# Patient Record
Sex: MALE | Race: WHITE | Employment: OTHER
[De-identification: names, ages, dates, MRNs, and addresses within clinical notes are randomized per-mention and may not be internally consistent; named-entity substitution may affect disease eponyms.]

---

## 2024-01-10 ENCOUNTER — OFFICE VISIT (OUTPATIENT)
Dept: INTERNAL MEDICINE CLINIC | Facility: CLINIC | Age: 69
End: 2024-01-10
Payer: MEDICARE

## 2024-01-10 VITALS
HEIGHT: 72 IN | WEIGHT: 284 LBS | TEMPERATURE: 98.3 F | HEART RATE: 68 BPM | BODY MASS INDEX: 38.47 KG/M2 | SYSTOLIC BLOOD PRESSURE: 162 MMHG | DIASTOLIC BLOOD PRESSURE: 94 MMHG | OXYGEN SATURATION: 97 %

## 2024-01-10 DIAGNOSIS — C43.62 MELANOMA OF LEFT UPPER ARM (HCC): ICD-10-CM

## 2024-01-10 DIAGNOSIS — Z13.220 SCREENING FOR HYPERCHOLESTEROLEMIA: ICD-10-CM

## 2024-01-10 DIAGNOSIS — I10 ESSENTIAL HYPERTENSION: ICD-10-CM

## 2024-01-10 DIAGNOSIS — E11.9 TYPE 2 DIABETES MELLITUS WITHOUT COMPLICATION, WITHOUT LONG-TERM CURRENT USE OF INSULIN (HCC): ICD-10-CM

## 2024-01-10 DIAGNOSIS — E11.9 TYPE 2 DIABETES MELLITUS WITHOUT COMPLICATION, WITHOUT LONG-TERM CURRENT USE OF INSULIN (HCC): Primary | ICD-10-CM

## 2024-01-10 LAB
ALBUMIN SERPL-MCNC: 3.9 G/DL (ref 3.2–4.6)
ALBUMIN/GLOB SERPL: 1.1 (ref 0.4–1.6)
ALP SERPL-CCNC: 69 U/L (ref 50–136)
ALT SERPL-CCNC: 34 U/L (ref 12–65)
ANION GAP SERPL CALC-SCNC: 5 MMOL/L (ref 2–11)
AST SERPL-CCNC: 23 U/L (ref 15–37)
BASOPHILS # BLD: 0 K/UL (ref 0–0.2)
BASOPHILS NFR BLD: 1 % (ref 0–2)
BILIRUB DIRECT SERPL-MCNC: 0.2 MG/DL
BILIRUB SERPL-MCNC: 0.4 MG/DL (ref 0.2–1.1)
BUN SERPL-MCNC: 24 MG/DL (ref 8–23)
CALCIUM SERPL-MCNC: 9.5 MG/DL (ref 8.3–10.4)
CHLORIDE SERPL-SCNC: 105 MMOL/L (ref 103–113)
CHOLEST SERPL-MCNC: 172 MG/DL
CO2 SERPL-SCNC: 26 MMOL/L (ref 21–32)
CREAT SERPL-MCNC: 0.9 MG/DL (ref 0.8–1.5)
DIFFERENTIAL METHOD BLD: ABNORMAL
EOSINOPHIL # BLD: 0.5 K/UL (ref 0–0.8)
EOSINOPHIL NFR BLD: 7 % (ref 0.5–7.8)
ERYTHROCYTE [DISTWIDTH] IN BLOOD BY AUTOMATED COUNT: 13.1 % (ref 11.9–14.6)
GLOBULIN SER CALC-MCNC: 3.4 G/DL (ref 2.8–4.5)
GLUCOSE SERPL-MCNC: 129 MG/DL (ref 65–100)
HCT VFR BLD AUTO: 38.4 % (ref 41.1–50.3)
HGB BLD-MCNC: 12.9 G/DL (ref 13.6–17.2)
IMM GRANULOCYTES # BLD AUTO: 0 K/UL (ref 0–0.5)
IMM GRANULOCYTES NFR BLD AUTO: 0 % (ref 0–5)
LYMPHOCYTES # BLD: 1.5 K/UL (ref 0.5–4.6)
LYMPHOCYTES NFR BLD: 20 % (ref 13–44)
MCH RBC QN AUTO: 33.3 PG (ref 26.1–32.9)
MCHC RBC AUTO-ENTMCNC: 33.6 G/DL (ref 31.4–35)
MCV RBC AUTO: 99.2 FL (ref 82–102)
MONOCYTES # BLD: 0.9 K/UL (ref 0.1–1.3)
MONOCYTES NFR BLD: 12 % (ref 4–12)
NEUTS SEG # BLD: 4.3 K/UL (ref 1.7–8.2)
NEUTS SEG NFR BLD: 60 % (ref 43–78)
NRBC # BLD: 0 K/UL (ref 0–0.2)
PLATELET # BLD AUTO: 231 K/UL (ref 150–450)
PMV BLD AUTO: 10.6 FL (ref 9.4–12.3)
POTASSIUM SERPL-SCNC: 4.9 MMOL/L (ref 3.5–5.1)
PROT SERPL-MCNC: 7.3 G/DL (ref 6.3–8.2)
RBC # BLD AUTO: 3.87 M/UL (ref 4.23–5.6)
SODIUM SERPL-SCNC: 136 MMOL/L (ref 136–146)
WBC # BLD AUTO: 7.2 K/UL (ref 4.3–11.1)

## 2024-01-10 PROCEDURE — 2022F DILAT RTA XM EVC RTNOPTHY: CPT | Performed by: INTERNAL MEDICINE

## 2024-01-10 PROCEDURE — G8417 CALC BMI ABV UP PARAM F/U: HCPCS | Performed by: INTERNAL MEDICINE

## 2024-01-10 PROCEDURE — 1036F TOBACCO NON-USER: CPT | Performed by: INTERNAL MEDICINE

## 2024-01-10 PROCEDURE — 99204 OFFICE O/P NEW MOD 45 MIN: CPT | Performed by: INTERNAL MEDICINE

## 2024-01-10 PROCEDURE — 3077F SYST BP >= 140 MM HG: CPT | Performed by: INTERNAL MEDICINE

## 2024-01-10 PROCEDURE — 3046F HEMOGLOBIN A1C LEVEL >9.0%: CPT | Performed by: INTERNAL MEDICINE

## 2024-01-10 PROCEDURE — 3017F COLORECTAL CA SCREEN DOC REV: CPT | Performed by: INTERNAL MEDICINE

## 2024-01-10 PROCEDURE — G8427 DOCREV CUR MEDS BY ELIG CLIN: HCPCS | Performed by: INTERNAL MEDICINE

## 2024-01-10 PROCEDURE — 1123F ACP DISCUSS/DSCN MKR DOCD: CPT | Performed by: INTERNAL MEDICINE

## 2024-01-10 PROCEDURE — 3080F DIAST BP >= 90 MM HG: CPT | Performed by: INTERNAL MEDICINE

## 2024-01-10 PROCEDURE — G8484 FLU IMMUNIZE NO ADMIN: HCPCS | Performed by: INTERNAL MEDICINE

## 2024-01-10 RX ORDER — GABAPENTIN 300 MG/1
300 CAPSULE ORAL 3 TIMES DAILY
COMMUNITY
Start: 2021-12-06 | End: 2024-01-10

## 2024-01-10 RX ORDER — METFORMIN HYDROCHLORIDE 750 MG/1
TABLET, EXTENDED RELEASE ORAL
COMMUNITY
Start: 2023-12-24

## 2024-01-10 RX ORDER — CARVEDILOL 6.25 MG/1
6.25 TABLET ORAL 2 TIMES DAILY WITH MEALS
COMMUNITY

## 2024-01-10 RX ORDER — HYDRALAZINE HYDROCHLORIDE 50 MG/1
TABLET, FILM COATED ORAL
COMMUNITY
Start: 2023-11-24

## 2024-01-10 RX ORDER — BENAZEPRIL HYDROCHLORIDE 40 MG/1
40 TABLET ORAL EVERY MORNING
COMMUNITY
Start: 2011-05-11

## 2024-01-10 RX ORDER — DESONIDE 0.5 MG/G
1 CREAM TOPICAL
COMMUNITY

## 2024-01-10 RX ORDER — TRAMETINIB 2 MG/1
2 TABLET, FILM COATED ORAL DAILY
COMMUNITY
Start: 2021-10-27 | End: 2024-01-10

## 2024-01-10 RX ORDER — MELOXICAM 15 MG/1
15 TABLET ORAL DAILY
COMMUNITY
End: 2024-01-10 | Stop reason: SINTOL

## 2024-01-10 RX ORDER — KETOCONAZOLE 20 MG/G
1 CREAM TOPICAL
COMMUNITY

## 2024-01-10 RX ORDER — DABRAFENIB 75 MG/1
CAPSULE ORAL
COMMUNITY
Start: 2021-10-27 | End: 2024-01-10

## 2024-01-10 RX ORDER — AMLODIPINE BESYLATE 10 MG/1
10 TABLET ORAL DAILY
COMMUNITY
Start: 2017-12-15 | End: 2024-01-10

## 2024-01-10 RX ORDER — NEBIVOLOL 5 MG/1
5 TABLET ORAL EVERY MORNING
COMMUNITY
Start: 2011-05-11 | End: 2024-01-10

## 2024-01-10 RX ORDER — LISINOPRIL 40 MG/1
40 TABLET ORAL
COMMUNITY

## 2024-01-10 RX ORDER — SPIRONOLACTONE 25 MG/1
25 TABLET ORAL
COMMUNITY

## 2024-01-10 RX ORDER — ASPIRIN 81 MG/1
81 TABLET, CHEWABLE ORAL DAILY
COMMUNITY

## 2024-01-10 SDOH — ECONOMIC STABILITY: FOOD INSECURITY: WITHIN THE PAST 12 MONTHS, YOU WORRIED THAT YOUR FOOD WOULD RUN OUT BEFORE YOU GOT MONEY TO BUY MORE.: NEVER TRUE

## 2024-01-10 SDOH — ECONOMIC STABILITY: FOOD INSECURITY: WITHIN THE PAST 12 MONTHS, THE FOOD YOU BOUGHT JUST DIDN'T LAST AND YOU DIDN'T HAVE MONEY TO GET MORE.: NEVER TRUE

## 2024-01-10 SDOH — ECONOMIC STABILITY: INCOME INSECURITY: HOW HARD IS IT FOR YOU TO PAY FOR THE VERY BASICS LIKE FOOD, HOUSING, MEDICAL CARE, AND HEATING?: NOT HARD AT ALL

## 2024-01-10 SDOH — ECONOMIC STABILITY: HOUSING INSECURITY
IN THE LAST 12 MONTHS, WAS THERE A TIME WHEN YOU DID NOT HAVE A STEADY PLACE TO SLEEP OR SLEPT IN A SHELTER (INCLUDING NOW)?: NO

## 2024-01-10 ASSESSMENT — ANXIETY QUESTIONNAIRES
5. BEING SO RESTLESS THAT IT IS HARD TO SIT STILL: 0
1. FEELING NERVOUS, ANXIOUS, OR ON EDGE: 0
6. BECOMING EASILY ANNOYED OR IRRITABLE: 0
2. NOT BEING ABLE TO STOP OR CONTROL WORRYING: 0
IF YOU CHECKED OFF ANY PROBLEMS ON THIS QUESTIONNAIRE, HOW DIFFICULT HAVE THESE PROBLEMS MADE IT FOR YOU TO DO YOUR WORK, TAKE CARE OF THINGS AT HOME, OR GET ALONG WITH OTHER PEOPLE: NOT DIFFICULT AT ALL
GAD7 TOTAL SCORE: 0
7. FEELING AFRAID AS IF SOMETHING AWFUL MIGHT HAPPEN: 0
4. TROUBLE RELAXING: 0
3. WORRYING TOO MUCH ABOUT DIFFERENT THINGS: 0

## 2024-01-10 ASSESSMENT — PATIENT HEALTH QUESTIONNAIRE - PHQ9
2. FEELING DOWN, DEPRESSED OR HOPELESS: 0
SUM OF ALL RESPONSES TO PHQ QUESTIONS 1-9: 0
1. LITTLE INTEREST OR PLEASURE IN DOING THINGS: 0
SUM OF ALL RESPONSES TO PHQ9 QUESTIONS 1 & 2: 0
SUM OF ALL RESPONSES TO PHQ QUESTIONS 1-9: 0

## 2024-01-10 ASSESSMENT — ENCOUNTER SYMPTOMS: SHORTNESS OF BREATH: 0

## 2024-01-10 NOTE — PROGRESS NOTES
[facility]  Santosh Rosales M.D.  Internal Medicine  [address] [place] [address]  Office : [phone]  Fax : [phone]    Chief Complaint   Patient presents with    Diabetes       History of Present Illness:  Yair Gee is a 68 y.o. male.  HPI    Diabetes Mellitus  Patient presents  for follow up on diabetes.  Onset of symptoms was several years ago. Patient describes symptoms as none. Course to date has been well controlled.Diet and Lifestyle: follows a diabetic diet regularly  exercises sporadically  nonsmoker  Home glucose monitoring:  Results average n/a. Patient denies polyuria and polydipsia. No wounds in lower limbs.  Most recent HbA1c was No results found for: \"LABA1C\", \"YYM0MFMP\"    Hypertension  Patient is in for Hypertension which is  controlled at home .    Diet and Lifestyle: generally follows a low sodium diet  Home BP Monitoring: is well controlled at home, ranging 120's/80's.  Patient's recent blood pressures were   BP Readings from Last 3 Encounters:   01/10/24 (!) 162/94   .   taking medications as instructed, no medication side effects noted, no TIA's, no chest pain on exertion, no dyspnea on exertion, no swelling of ankles. Cardiac risk factors consist of advanced age (older than 55 for men, 65 for women), diabetes mellitus, hypertension, and male gender.  No results found for: \"NA\", \"K\", \"CL\", \"CO2\", \"BUN\", \"CREATININE\", \"GLUCOSE\", \"CALCIUM\", \"LABGLOM\"     Melanoma skin cancer  Stage 3 in left arm that was present in a lymph node in 2018.  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  Surgical pathology was negative for malignancy    Past Medical History:  Past Medical History:   Diagnosis Date    Cancer (HCC)     Hypertension     Type 2 diabetes mellitus without complication (HCC)      Past Surgical

## 2024-01-11 LAB
EST. AVERAGE GLUCOSE BLD GHB EST-MCNC: 140 MG/DL
HBA1C MFR BLD: 6.5 % (ref 4.8–5.6)

## 2024-01-18 DIAGNOSIS — C43.62 MELANOMA OF LEFT UPPER ARM (HCC): Primary | ICD-10-CM

## 2024-01-22 RX ORDER — LISINOPRIL 40 MG/1
40 TABLET ORAL DAILY
Qty: 90 TABLET | Refills: 0 | Status: CANCELLED | OUTPATIENT
Start: 2024-01-22

## 2024-01-22 NOTE — TELEPHONE ENCOUNTER
Patient called to request a refill of Lisinopril 40 mg, taken once a day, last prescribed by previous physician on 10-21-23, #90 no RF. [facility] in [place].  Last seen 1-10-24 Next OV 4-10-24

## 2024-01-22 NOTE — TELEPHONE ENCOUNTER
At last visit he said he was taking benazepril and not lisinopril.  But this note requests lisinopril.  Please confirm the medication

## 2024-01-23 RX ORDER — LISINOPRIL 40 MG/1
40 TABLET ORAL DAILY
Qty: 90 TABLET | Refills: 0 | Status: SHIPPED | OUTPATIENT
Start: 2024-01-23

## 2024-02-06 ENCOUNTER — HOSPITAL ENCOUNTER (OUTPATIENT)
Dept: LAB | Age: 69
Discharge: HOME | End: 2024-02-09
Payer: MEDICARE

## 2024-02-06 ENCOUNTER — OFFICE VISIT (OUTPATIENT)
Dept: ONCOLOGY | Age: 69
End: 2024-02-06
Payer: MEDICARE

## 2024-02-06 VITALS
RESPIRATION RATE: 17 BRPM | OXYGEN SATURATION: 96 % | TEMPERATURE: 97.7 F | WEIGHT: 262.5 LBS | HEART RATE: 84 BPM | HEIGHT: 72 IN | SYSTOLIC BLOOD PRESSURE: 133 MMHG | DIASTOLIC BLOOD PRESSURE: 90 MMHG | BODY MASS INDEX: 35.55 KG/M2

## 2024-02-06 DIAGNOSIS — I49.9 IRREGULAR HEART BEAT: ICD-10-CM

## 2024-02-06 DIAGNOSIS — C43.9 METASTATIC MELANOMA (HCC): ICD-10-CM

## 2024-02-06 DIAGNOSIS — C43.62 MELANOMA OF LEFT UPPER ARM (HCC): Primary | ICD-10-CM

## 2024-02-06 DIAGNOSIS — C43.62 MELANOMA OF LEFT UPPER ARM (HCC): ICD-10-CM

## 2024-02-06 LAB
ALBUMIN SERPL-MCNC: 3.9 G/DL (ref 3.2–4.6)
ALBUMIN/GLOB SERPL: 1.1 (ref 0.4–1.6)
ALP SERPL-CCNC: 77 U/L (ref 50–136)
ALT SERPL-CCNC: 33 U/L (ref 12–65)
ANION GAP SERPL CALC-SCNC: 3 MMOL/L (ref 2–11)
AST SERPL-CCNC: 27 U/L (ref 15–37)
BASOPHILS # BLD: 0.1 K/UL (ref 0–0.2)
BASOPHILS NFR BLD: 1 % (ref 0–2)
BILIRUB SERPL-MCNC: 0.5 MG/DL (ref 0.2–1.1)
BUN SERPL-MCNC: 27 MG/DL (ref 8–23)
CALCIUM SERPL-MCNC: 9.4 MG/DL (ref 8.3–10.4)
CEA SERPL-MCNC: 1.6 NG/ML (ref 0–3)
CHLORIDE SERPL-SCNC: 106 MMOL/L (ref 103–113)
CO2 SERPL-SCNC: 26 MMOL/L (ref 21–32)
CREAT SERPL-MCNC: 1.3 MG/DL (ref 0.8–1.5)
DIFFERENTIAL METHOD BLD: ABNORMAL
EOSINOPHIL # BLD: 0.6 K/UL (ref 0–0.8)
EOSINOPHIL NFR BLD: 7 % (ref 0.5–7.8)
ERYTHROCYTE [DISTWIDTH] IN BLOOD BY AUTOMATED COUNT: 13.4 % (ref 11.9–14.6)
GLOBULIN SER CALC-MCNC: 3.6 G/DL (ref 2.8–4.5)
GLUCOSE SERPL-MCNC: 209 MG/DL (ref 65–100)
HCT VFR BLD AUTO: 41.3 % (ref 41.1–50.3)
HGB BLD-MCNC: 14.1 G/DL (ref 13.6–17.2)
IMM GRANULOCYTES # BLD AUTO: 0 K/UL (ref 0–0.5)
IMM GRANULOCYTES NFR BLD AUTO: 0 % (ref 0–5)
LYMPHOCYTES # BLD: 1.4 K/UL (ref 0.5–4.6)
LYMPHOCYTES NFR BLD: 18 % (ref 13–44)
MCH RBC QN AUTO: 33.8 PG (ref 26.1–32.9)
MCHC RBC AUTO-ENTMCNC: 34.1 G/DL (ref 31.4–35)
MCV RBC AUTO: 99 FL (ref 82–102)
MONOCYTES # BLD: 0.8 K/UL (ref 0.1–1.3)
MONOCYTES NFR BLD: 11 % (ref 4–12)
NEUTS SEG # BLD: 5 K/UL (ref 1.7–8.2)
NEUTS SEG NFR BLD: 63 % (ref 43–78)
NRBC # BLD: 0 K/UL (ref 0–0.2)
PLATELET # BLD AUTO: 258 K/UL (ref 150–450)
PMV BLD AUTO: 9.6 FL (ref 9.4–12.3)
POTASSIUM SERPL-SCNC: 5.3 MMOL/L (ref 3.5–5.1)
PROT SERPL-MCNC: 7.5 G/DL (ref 6.3–8.2)
RBC # BLD AUTO: 4.17 M/UL (ref 4.23–5.6)
SODIUM SERPL-SCNC: 135 MMOL/L (ref 136–146)
WBC # BLD AUTO: 7.9 K/UL (ref 4.3–11.1)

## 2024-02-06 PROCEDURE — 99205 OFFICE O/P NEW HI 60 MIN: CPT | Performed by: INTERNAL MEDICINE

## 2024-02-06 PROCEDURE — 1036F TOBACCO NON-USER: CPT | Performed by: INTERNAL MEDICINE

## 2024-02-06 PROCEDURE — 85025 COMPLETE CBC W/AUTO DIFF WBC: CPT

## 2024-02-06 PROCEDURE — G8417 CALC BMI ABV UP PARAM F/U: HCPCS | Performed by: INTERNAL MEDICINE

## 2024-02-06 PROCEDURE — 3017F COLORECTAL CA SCREEN DOC REV: CPT | Performed by: INTERNAL MEDICINE

## 2024-02-06 PROCEDURE — G8484 FLU IMMUNIZE NO ADMIN: HCPCS | Performed by: INTERNAL MEDICINE

## 2024-02-06 PROCEDURE — G8427 DOCREV CUR MEDS BY ELIG CLIN: HCPCS | Performed by: INTERNAL MEDICINE

## 2024-02-06 PROCEDURE — 80053 COMPREHEN METABOLIC PANEL: CPT

## 2024-02-06 PROCEDURE — 1123F ACP DISCUSS/DSCN MKR DOCD: CPT | Performed by: INTERNAL MEDICINE

## 2024-02-06 PROCEDURE — 82378 CARCINOEMBRYONIC ANTIGEN: CPT

## 2024-02-06 RX ORDER — CHLORAL HYDRATE 500 MG
CAPSULE ORAL DAILY
COMMUNITY

## 2024-02-06 RX ORDER — M-VIT,TX,IRON,MINS/CALC/FOLIC 27MG-0.4MG
1 TABLET ORAL DAILY
COMMUNITY

## 2024-02-06 ASSESSMENT — PATIENT HEALTH QUESTIONNAIRE - PHQ9
SUM OF ALL RESPONSES TO PHQ QUESTIONS 1-9: 0
1. LITTLE INTEREST OR PLEASURE IN DOING THINGS: 0
SUM OF ALL RESPONSES TO PHQ QUESTIONS 1-9: 0
SUM OF ALL RESPONSES TO PHQ9 QUESTIONS 1 & 2: 0
SUM OF ALL RESPONSES TO PHQ QUESTIONS 1-9: 0
2. FEELING DOWN, DEPRESSED OR HOPELESS: 0
SUM OF ALL RESPONSES TO PHQ QUESTIONS 1-9: 0

## 2024-02-06 NOTE — PROGRESS NOTES
[facility] Hematology & Oncology: Office Visit New Patient H/P    Chief Complaint:    Left arm melanoma  Left axilla metastasis  Left axillary relapse    History of Present Illness:  Referral Diagnosis: Melanoma of left upper arm     Referring Provider: Santosh Rosales MD     Primary Care Provider: Santosh Rosales MD     Family History of Cancer/ Hematology Disorders: No known family history      Presenting Symptoms: Melanoma of left upper arm      68 y.o. white male     2/22/18 - DERMATOPATHOLOGY REPORT (CE)  Final Diagnosis  SKIN, LABELED AS \"LEFT UPPER ARM,\" BIOPSY ([facility] [id number]):  MALIGNANT MELANOMA, INVASIVE TUMOR THICKNESS (BRESLOW DEPTH): 0.9 AT LEAST ULCERATION: NOT IDENTIFIED  MITOTIC RATE: 1 PER SQUARE MILLIMETER  LYMPHOVASCULAR INVASION: NOT IDENTIFIED  PERINEURAL INVASION: NOT IDENTIFIED  TUMOR INFILTRATING LYMPHOCYTES: NON-BRISK  TUMOR REGRESSION: NOT IDENTIFIED  MICROSATELLITOSIS: NOT IDENTIFIED  SURGICAL MARGINS: INVOLVED BY MELANOMA  ASSOCIATED MELANOCYTIC NEVUS: NOT IDENTIFIED  PATHOLOGIC T STAGE: pT1b AT LEAST                         3/20/18 - NM LYMPHOSCINTIGRAM (CE)  IMPRESSION:  A sentinel lymph node in the left axilla was identified and marked.     4/10/18 - PET CT Whole Body Melanoma (CE)  IMPRESSION:  No scintigraphic evidence of metastatic disease.  Postsurgical changes of a wide local excision in the proximal left upper extremity, as well as a left axillary nicole dissection with a non-hypermetabolic fluid collection in the left axilla, which is compatible with a seroma.     5/3/18 - CYTOGENETICS REPORT (CE)  Final Diagnosis  B. LYMPH NODES, LABELED AS \"527 BLUE,\" EXCISION ([facility] [id number]):  Variants Detected (Variant Summary)  Classification 1: Strong clinical significance  BRAF Exon 15 missense_variant: VAF=12% p.V600E  Classification 2: Potential clinical significance  No Classification 2 Mutations Detected  Classification 3: Unknown clinical significance  No Classification 3

## 2024-02-06 NOTE — PATIENT INSTRUCTIONS
Patient Instructions from Today's Visit    Reason for Visit:  New patient visit for melanoma - establish care      Plan:    Irregular heart rate heard on exam - will get an EKG.  Will get another CT scan in 3-4 months.  Will get your PET from September scanned into our system for comparison.    Follow Up:  - 3-4 months    Recent Lab Results:  Hospital Outpatient Visit on 02/06/2024   Component Date Value Ref Range Status    CEA 02/06/2024 1.6  0.0 - 3.0 ng/mL Final    Comment: Nonsmoker:  <3.0 ng/mL  Smoker:     <5.0 ng/mL  [employer] LOCI technology. Patient's results of tumor marker testing may not be comparable to labs using different manufacturers/methods.      Sodium 02/06/2024 135 (L)  136 - 146 mmol/L Final    Potassium 02/06/2024 5.3 (H)  3.5 - 5.1 mmol/L Final    Chloride 02/06/2024 106  103 - 113 mmol/L Final    CO2 02/06/2024 26  21 - 32 mmol/L Final    Anion Gap 02/06/2024 3  2 - 11 mmol/L Final    Glucose 02/06/2024 209 (H)  65 - 100 mg/dL Final    BUN 02/06/2024 27 (H)  8 - 23 MG/DL Final    Creatinine 02/06/2024 1.30  0.8 - 1.5 MG/DL Final    Est, Glom Filt Rate 02/06/2024 59 (L)  >60 ml/min/1.73m2 Final    Comment:    Pediatric calculator link: https://www.kidney.org/professionals/kdoqi/gfr_calculatorped     These results are not intended for use in patients <18 years of age.     eGFR results are calculated without a race factor using  the 2021 CKD-EPI equation. Careful clinical correlation is recommended, particularly when comparing to results calculated using previous equations.  The CKD-EPI equation is less accurate in patients with extremes of muscle mass, extra-renal metabolism of creatinine, excessive creatine ingestion, or following therapy that affects renal tubular secretion.      Calcium 02/06/2024 9.4  8.3 - 10.4 MG/DL Final    Total Bilirubin 02/06/2024 0.5  0.2 - 1.1 MG/DL Final    ALT 02/06/2024 33  12 - 65 U/L Final    AST 02/06/2024 27  15 - 37 U/L Final    Alk Phosphatase

## 2024-02-06 NOTE — PROGRESS NOTES
Request for PET done in September @ [facility] to be pushed into PACS faxed to radiology with confirmation of receipt.

## 2024-02-06 NOTE — PROGRESS NOTES
FLT3 GNA11 GNAQ GNAS HNF1A HRAS IDH1 IDH2 JAK2 JAK3 KDR KIT KRAS MET MLH1 MPL NOTCH1 NPM1 NRAS PDGFRA PIK3CA PTEN PTPN11 RB1 RET SMAD4 SMARCB1 SMO SRC STK11 TP53 VHL  No NRAS or KIT variants identified.    5/3/18 - DERMATOPATHOLOGY REPORT (CE)  Final Diagnosis  SYNOPTIC REPORT:  Invasive melanoma  Tumor thickness (Breslow depth): 0.9 MM at least (original biopsy specimen)  Ulceration: not identified  Lymphovascular invasion: not identified  Perineural invasion: not identified  Microsatellites: not identified  Lymph nodes: metastatic melanoma in one of three (1/3) sentinel lymph nodes  Pathologic stage: pT1b N1a    SKIN, LABELED AS \"LEFT UPPER ARM,\" EXCISION ([place] [id number]):  MALIGNANT MELANOMA, INVASIVE  TUMOR THICKNESS (BRESLOW DEPTH): 0.6 MM ULCERATION: NOT IDENTIFIED  MITOTIC RATE: 0 PER SQUARE MILLIMETER  LYMPHOVASCULAR INVASION: NOT IDENTIFIED  PERINEURAL INVASION: NOT IDENTIFIED  TUMOR INFILTRATING LYMPHOCYTES: NON-BRISK  TUMOR REGRESSION: NOT IDENTIFIED  MICROSATELLITOSIS: NOT IDENTIFIED  SURGICAL MARGINS: UNINVOLVED BY MELANOMA  ASSOCIATED MELANOCYTIC NEVUS: NOT IDENTIFIED  LYMPH NODES, LABELED AS \"527 BLUE,\" EXCISION:    METASTATIC MELANOMA PRESENT IN ONE OF THREE LYMPH NODES (1/3)  MAXIMUM DIMENSION OF METASTATIC DEPOSIT: 1.0 CM            IMMUNOHISTOCHEMICAL STAINS (PERFORMED AT OUTSIDE FACILITY, AVAILABLE FOR REVIEW)  MELANOMA COCKTAIL NEGATIVE: BLOCKS B1, B2, B3, B4, B5, B11  S-100 NEGATIVE FOR METASTATIC DISEASE: BLOCKS B1, B2, B3, B4, B5, B11  SEE COMMENT: NO EXTRACAPSULAR EXTENSION IDENTIFIED    COMMENT: Non-specific melanoma cocktail and S-100 positivity is noted in isolated cells in blocks B1, B4, and B11.  Due to the location of the cells in relation to the metastatic deposit, these are interpreted as artifactual.      5/18/18 - Patient initiated Opdivo therapy, given once every 2 weeks x 1 year. (Proficient)    8/20/18 - Mammo US Left (CE)  IMPRESSION: BIRADS CATEGORY 4B: SUSPICIOUS

## 2024-02-07 ENCOUNTER — NURSE ONLY (OUTPATIENT)
Age: 69
End: 2024-02-07
Payer: MEDICARE

## 2024-02-07 DIAGNOSIS — C43.62 MELANOMA OF LEFT UPPER ARM (HCC): Primary | ICD-10-CM

## 2024-02-07 PROCEDURE — 93000 ELECTROCARDIOGRAM COMPLETE: CPT | Performed by: INTERNAL MEDICINE

## 2024-03-26 RX ORDER — METFORMIN HYDROCHLORIDE 750 MG/1
TABLET, EXTENDED RELEASE ORAL
Qty: 90 TABLET | Refills: 0 | Status: SHIPPED | OUTPATIENT
Start: 2024-03-26

## 2024-03-26 NOTE — TELEPHONE ENCOUNTER
Patient called to request a refill of Metformin  mg, taking 1 tablet daily with the evening meal, last prescribed 12-24-23 #90 0RF  by a previous physician.  [facility] in [place]  Last seen 1-10-24, next OV 4-10-24

## 2024-03-27 RX ORDER — LISINOPRIL 40 MG/1
40 TABLET ORAL DAILY
Qty: 30 TABLET | Refills: 0 | OUTPATIENT
Start: 2024-03-27

## 2024-04-10 ENCOUNTER — OFFICE VISIT (OUTPATIENT)
Dept: INTERNAL MEDICINE CLINIC | Facility: CLINIC | Age: 69
End: 2024-04-10
Payer: MEDICARE

## 2024-04-10 VITALS
SYSTOLIC BLOOD PRESSURE: 140 MMHG | TEMPERATURE: 98.2 F | WEIGHT: 265 LBS | OXYGEN SATURATION: 97 % | DIASTOLIC BLOOD PRESSURE: 90 MMHG | BODY MASS INDEX: 35.89 KG/M2 | HEIGHT: 72 IN | HEART RATE: 64 BPM

## 2024-04-10 DIAGNOSIS — C43.62 MELANOMA OF LEFT UPPER ARM (HCC): ICD-10-CM

## 2024-04-10 DIAGNOSIS — I10 ESSENTIAL HYPERTENSION: ICD-10-CM

## 2024-04-10 DIAGNOSIS — E11.9 TYPE 2 DIABETES MELLITUS WITHOUT COMPLICATION, WITHOUT LONG-TERM CURRENT USE OF INSULIN (HCC): Primary | ICD-10-CM

## 2024-04-10 DIAGNOSIS — E11.9 TYPE 2 DIABETES MELLITUS WITHOUT COMPLICATION, WITHOUT LONG-TERM CURRENT USE OF INSULIN (HCC): ICD-10-CM

## 2024-04-10 LAB
CREAT UR-MCNC: 43 MG/DL
MICROALBUMIN UR-MCNC: <0.5 MG/DL
MICROALBUMIN/CREAT UR-RTO: NORMAL MG/G (ref 0–30)

## 2024-04-10 PROCEDURE — G2211 COMPLEX E/M VISIT ADD ON: HCPCS | Performed by: INTERNAL MEDICINE

## 2024-04-10 PROCEDURE — 3017F COLORECTAL CA SCREEN DOC REV: CPT | Performed by: INTERNAL MEDICINE

## 2024-04-10 PROCEDURE — G8417 CALC BMI ABV UP PARAM F/U: HCPCS | Performed by: INTERNAL MEDICINE

## 2024-04-10 PROCEDURE — G8427 DOCREV CUR MEDS BY ELIG CLIN: HCPCS | Performed by: INTERNAL MEDICINE

## 2024-04-10 PROCEDURE — 3079F DIAST BP 80-89 MM HG: CPT | Performed by: INTERNAL MEDICINE

## 2024-04-10 PROCEDURE — 3044F HG A1C LEVEL LT 7.0%: CPT | Performed by: INTERNAL MEDICINE

## 2024-04-10 PROCEDURE — 3077F SYST BP >= 140 MM HG: CPT | Performed by: INTERNAL MEDICINE

## 2024-04-10 PROCEDURE — 2022F DILAT RTA XM EVC RTNOPTHY: CPT | Performed by: INTERNAL MEDICINE

## 2024-04-10 PROCEDURE — 1123F ACP DISCUSS/DSCN MKR DOCD: CPT | Performed by: INTERNAL MEDICINE

## 2024-04-10 PROCEDURE — 1036F TOBACCO NON-USER: CPT | Performed by: INTERNAL MEDICINE

## 2024-04-10 PROCEDURE — 99214 OFFICE O/P EST MOD 30 MIN: CPT | Performed by: INTERNAL MEDICINE

## 2024-04-10 RX ORDER — HYDRALAZINE HYDROCHLORIDE 50 MG/1
50 TABLET, FILM COATED ORAL 3 TIMES DAILY
Qty: 270 TABLET | Refills: 1 | Status: SHIPPED | OUTPATIENT
Start: 2024-04-10

## 2024-04-10 RX ORDER — SPIRONOLACTONE 25 MG/1
25 TABLET ORAL DAILY
Qty: 90 TABLET | Refills: 1 | Status: SHIPPED | OUTPATIENT
Start: 2024-04-10

## 2024-04-10 RX ORDER — CARVEDILOL 6.25 MG/1
6.25 TABLET ORAL 2 TIMES DAILY WITH MEALS
Qty: 180 TABLET | Refills: 1 | Status: SHIPPED | OUTPATIENT
Start: 2024-04-10

## 2024-04-10 RX ORDER — LISINOPRIL 40 MG/1
40 TABLET ORAL DAILY
Qty: 90 TABLET | Refills: 1 | Status: SHIPPED | OUTPATIENT
Start: 2024-04-10

## 2024-04-10 RX ORDER — METFORMIN HYDROCHLORIDE 750 MG/1
TABLET, EXTENDED RELEASE ORAL
Qty: 90 TABLET | Refills: 1 | Status: SHIPPED | OUTPATIENT
Start: 2024-04-10

## 2024-04-10 ASSESSMENT — ENCOUNTER SYMPTOMS: SHORTNESS OF BREATH: 0

## 2024-04-10 NOTE — PROGRESS NOTES
[facility]  Santosh Rosales M.D.  Internal Medicine  [address] [place] [address]  Office : [phone]  Fax : [phone]    Chief Complaint   Patient presents with    Diabetes     3 mo follow up       History of Present Illness:  Yair Gee is a 68 y.o. male.  HPI    Diabetes Mellitus  Patient presents  for follow up on diabetes.  Onset of symptoms was several years ago. Patient describes symptoms as none. Course to date has been well controlled.Diet and Lifestyle: follows a diabetic diet regularly  exercises sporadically  nonsmoker  Home glucose monitoring:  Results average n/a. Patient denies polyuria and polydipsia. No wounds in lower limbs.  Most recent HbA1c was   Hemoglobin A1C   Date Value Ref Range Status   01/10/2024 6.5 (H) 4.8 - 5.6 % Final       Hypertension  Patient is in for Hypertension which is stable.    Diet and Lifestyle: generally follows a low sodium diet  Home BP Monitoring: is not measured at home. Patient's recent blood pressures were   BP Readings from Last 3 Encounters:   04/10/24 (!) 140/90   02/06/24 (!) 133/90   01/10/24 (!) 162/94   .   taking medications as instructed, no medication side effects noted, no TIA's, no chest pain on exertion, no dyspnea on exertion, no swelling of ankles. Cardiac risk factors consist of diabetes mellitus, hypertension, and male gender.  Lab Results   Component Value Date/Time     02/06/2024 01:08 PM    K 5.3 02/06/2024 01:08 PM     02/06/2024 01:08 PM    CO2 26 02/06/2024 01:08 PM    BUN 27 02/06/2024 01:08 PM    CREATININE 1.30 02/06/2024 01:08 PM    GLUCOSE 209 02/06/2024 01:08 PM    CALCIUM 9.4 02/06/2024 01:08 PM    LABGLOM 59 02/06/2024 01:08 PM      Melanoma skin cancer  Stage 3 in left arm that was present in a lymph node in 2018.  It was found again in 2020 in lymph nodes and had resection and radiation.  IN CR  Oncology following    Pancreatic Mass  [facility] diagnosed

## 2024-04-30 ENCOUNTER — HOSPITAL ENCOUNTER (OUTPATIENT)
Dept: CT IMAGING | Age: 69
Discharge: HOME | End: 2024-05-03
Attending: INTERNAL MEDICINE
Payer: MEDICARE

## 2024-04-30 DIAGNOSIS — C43.62 MELANOMA OF LEFT UPPER ARM (HCC): ICD-10-CM

## 2024-04-30 DIAGNOSIS — C43.9 METASTATIC MELANOMA (HCC): ICD-10-CM

## 2024-04-30 LAB — CREAT BLD-MCNC: 0.76 MG/DL (ref 0.8–1.5)

## 2024-04-30 PROCEDURE — 6360000004 HC RX CONTRAST MEDICATION: Performed by: INTERNAL MEDICINE

## 2024-04-30 PROCEDURE — 82565 ASSAY OF CREATININE: CPT

## 2024-04-30 PROCEDURE — 74177 CT ABD & PELVIS W/CONTRAST: CPT

## 2024-04-30 RX ADMIN — DIATRIZOATE MEGLUMINE AND DIATRIZOATE SODIUM 15 ML: 660; 100 LIQUID ORAL; RECTAL at 09:45

## 2024-04-30 RX ADMIN — IOPAMIDOL 100 ML: 755 INJECTION, SOLUTION INTRAVENOUS at 09:45

## 2024-05-06 DIAGNOSIS — I49.9 IRREGULAR HEART BEAT: Primary | ICD-10-CM

## 2024-05-06 DIAGNOSIS — C43.62 MELANOMA OF LEFT UPPER ARM (HCC): ICD-10-CM

## 2024-05-07 ENCOUNTER — OFFICE VISIT (OUTPATIENT)
Dept: ONCOLOGY | Age: 69
End: 2024-05-07
Payer: MEDICARE

## 2024-05-07 ENCOUNTER — HOSPITAL ENCOUNTER (OUTPATIENT)
Dept: LAB | Age: 69
Discharge: HOME | End: 2024-05-10
Payer: MEDICARE

## 2024-05-07 VITALS
DIASTOLIC BLOOD PRESSURE: 94 MMHG | TEMPERATURE: 97.7 F | HEART RATE: 64 BPM | BODY MASS INDEX: 35.89 KG/M2 | RESPIRATION RATE: 18 BRPM | WEIGHT: 265 LBS | SYSTOLIC BLOOD PRESSURE: 181 MMHG | HEIGHT: 72 IN | OXYGEN SATURATION: 98 %

## 2024-05-07 DIAGNOSIS — C43.62 MELANOMA OF LEFT UPPER ARM (HCC): Primary | ICD-10-CM

## 2024-05-07 DIAGNOSIS — C43.9 METASTATIC MELANOMA (HCC): ICD-10-CM

## 2024-05-07 DIAGNOSIS — C43.62 MELANOMA OF LEFT UPPER ARM (HCC): ICD-10-CM

## 2024-05-07 LAB
ALBUMIN SERPL-MCNC: 3.8 G/DL (ref 3.2–4.6)
ALBUMIN/GLOB SERPL: 1.1 (ref 1–1.9)
ALP SERPL-CCNC: 62 U/L (ref 40–129)
ALT SERPL-CCNC: 26 U/L (ref 12–65)
ANION GAP SERPL CALC-SCNC: 10 MMOL/L (ref 9–18)
AST SERPL-CCNC: 31 U/L (ref 15–37)
BASOPHILS # BLD: 0.1 K/UL (ref 0–0.2)
BASOPHILS NFR BLD: 1 % (ref 0–2)
BILIRUB SERPL-MCNC: 0.4 MG/DL (ref 0–1.2)
BUN SERPL-MCNC: 22 MG/DL (ref 8–23)
CALCIUM SERPL-MCNC: 9.3 MG/DL (ref 8.8–10.2)
CEA SERPL-MCNC: 1.6 NG/ML (ref 0–3.8)
CHLORIDE SERPL-SCNC: 100 MMOL/L (ref 98–107)
CO2 SERPL-SCNC: 25 MMOL/L (ref 20–28)
CREAT SERPL-MCNC: 0.94 MG/DL (ref 0.8–1.3)
DIFFERENTIAL METHOD BLD: ABNORMAL
EOSINOPHIL # BLD: 0.5 K/UL (ref 0–0.8)
EOSINOPHIL NFR BLD: 8 % (ref 0.5–7.8)
ERYTHROCYTE [DISTWIDTH] IN BLOOD BY AUTOMATED COUNT: 13.3 % (ref 11.9–14.6)
GLOBULIN SER CALC-MCNC: 3.5 G/DL (ref 2.3–3.5)
GLUCOSE SERPL-MCNC: 144 MG/DL (ref 70–99)
HCT VFR BLD AUTO: 37.1 % (ref 41.1–50.3)
HGB BLD-MCNC: 12.5 G/DL (ref 13.6–17.2)
IMM GRANULOCYTES # BLD AUTO: 0 K/UL (ref 0–0.5)
IMM GRANULOCYTES NFR BLD AUTO: 0 % (ref 0–5)
LYMPHOCYTES # BLD: 1.3 K/UL (ref 0.5–4.6)
LYMPHOCYTES NFR BLD: 19 % (ref 13–44)
MCH RBC QN AUTO: 33.1 PG (ref 26.1–32.9)
MCHC RBC AUTO-ENTMCNC: 33.7 G/DL (ref 31.4–35)
MCV RBC AUTO: 98.1 FL (ref 82–102)
MONOCYTES # BLD: 0.9 K/UL (ref 0.1–1.3)
MONOCYTES NFR BLD: 13 % (ref 4–12)
NEUTS SEG # BLD: 4.1 K/UL (ref 1.7–8.2)
NEUTS SEG NFR BLD: 59 % (ref 43–78)
NRBC # BLD: 0 K/UL (ref 0–0.2)
PLATELET # BLD AUTO: 231 K/UL (ref 150–450)
PMV BLD AUTO: 9.7 FL (ref 9.4–12.3)
POTASSIUM SERPL-SCNC: 5.3 MMOL/L (ref 3.5–5.1)
PROT SERPL-MCNC: 7.3 G/DL (ref 6.3–8.2)
RBC # BLD AUTO: 3.78 M/UL (ref 4.23–5.6)
SODIUM SERPL-SCNC: 135 MMOL/L (ref 136–145)
WBC # BLD AUTO: 7 K/UL (ref 4.3–11.1)

## 2024-05-07 PROCEDURE — 85025 COMPLETE CBC W/AUTO DIFF WBC: CPT

## 2024-05-07 PROCEDURE — G8417 CALC BMI ABV UP PARAM F/U: HCPCS | Performed by: INTERNAL MEDICINE

## 2024-05-07 PROCEDURE — 3017F COLORECTAL CA SCREEN DOC REV: CPT | Performed by: INTERNAL MEDICINE

## 2024-05-07 PROCEDURE — 1123F ACP DISCUSS/DSCN MKR DOCD: CPT | Performed by: INTERNAL MEDICINE

## 2024-05-07 PROCEDURE — 82378 CARCINOEMBRYONIC ANTIGEN: CPT

## 2024-05-07 PROCEDURE — 80053 COMPREHEN METABOLIC PANEL: CPT

## 2024-05-07 PROCEDURE — 1036F TOBACCO NON-USER: CPT | Performed by: INTERNAL MEDICINE

## 2024-05-07 PROCEDURE — 99214 OFFICE O/P EST MOD 30 MIN: CPT | Performed by: INTERNAL MEDICINE

## 2024-05-07 PROCEDURE — G8427 DOCREV CUR MEDS BY ELIG CLIN: HCPCS | Performed by: INTERNAL MEDICINE

## 2024-05-07 PROCEDURE — 36415 COLL VENOUS BLD VENIPUNCTURE: CPT

## 2024-05-07 NOTE — PROGRESS NOTES
[facility] Hematology & Oncology: Office Visit Progress Note    Chief Complaint:    Left arm melanoma  Left axilla metastasis  Left axillary relapse    History of Present Illness:  Referral Diagnosis: Melanoma of left upper arm     Referring Provider: Santosh Rosales MD     Primary Care Provider: Santosh Rosales MD     Family History of Cancer/ Hematology Disorders: No known family history      Presenting Symptoms: Melanoma of left upper arm      68 y.o. white male     2/22/18 - DERMATOPATHOLOGY REPORT (CE)  Final Diagnosis  SKIN, LABELED AS \"LEFT UPPER [facility],\" [id number]  MALIGNANT MELANOMA, INVASIVE TUMOR THICKNESS (BRESLOW DEPTH): 0.9 AT LEAST ULCERATION: NOT IDENTIFIED  MITOTIC RATE: 1 PER SQUARE MILLIMETER  LYMPHOVASCULAR INVASION: NOT IDENTIFIED  PERINEURAL INVASION: NOT IDENTIFIED  TUMOR INFILTRATING LYMPHOCYTES: NON-BRISK  TUMOR REGRESSION: NOT IDENTIFIED  MICROSATELLITOSIS: NOT IDENTIFIED  SURGICAL MARGINS: INVOLVED BY MELANOMA  ASSOCIATED MELANOCYTIC NEVUS: NOT IDENTIFIED  PATHOLOGIC T STAGE: pT1b AT LEAST                         3/20/18 - NM LYMPHOSCINTIGRAM (CE)  IMPRESSION:  A sentinel lymph node in the left axilla was identified and marked.     4/10/18 - PET CT Whole Body Melanoma (CE)  IMPRESSION:  No scintigraphic evidence of metastatic disease.  Postsurgical changes of a wide local excision in the proximal left upper extremity, as well as a left axillary nicole dissection with a non-hypermetabolic fluid collection in the left axilla, which is compatible with a seroma.     5/3/18 - CYTOGENETICS REPORT (CE)  Final Diagnosis  B. LYMPH NODES, LABELED AS \"527 BLUE,\" EXCISION ([facility] [id number]):  Variants Detected (Variant Summary)  Classification 1: Strong clinical significance  BRAF Exon 15 missense_variant: VAF=12% p.V600E  Classification 2: Potential clinical significance  No Classification 2 Mutations Detected  Classification 3: Unknown clinical significance  No Classification 3

## 2024-05-07 NOTE — PATIENT INSTRUCTIONS
Patient Information from Today's Visit      Follow Up:    Melanoma     Instructions:     Dr Bone recommended your next scan to be scheduled in 1 year, or call sooner of new issues.   Will ask for a PET scan in 1 year for that scan.   We will see you about a week after your scan.    A radiology scheduler will call you in the next few days to set up your scan. If you do not hear from them, call the radiology scheduling line: [phone].      Current Labs:   Hospital Outpatient Visit on 05/07/2024   Component Date Value Ref Range Status    WBC 05/07/2024 7.0  4.3 - 11.1 K/uL Final    RBC 05/07/2024 3.78 (L)  4.23 - 5.6 M/uL Final    Hemoglobin 05/07/2024 12.5 (L)  13.6 - 17.2 g/dL Final    Hematocrit 05/07/2024 37.1 (L)  41.1 - 50.3 % Final    MCV 05/07/2024 98.1  82.0 - 102.0 FL Final    MCH 05/07/2024 33.1 (H)  26.1 - 32.9 PG Final    MCHC 05/07/2024 33.7  31.4 - 35.0 g/dL Final    RDW 05/07/2024 13.3  11.9 - 14.6 % Final    Platelets 05/07/2024 231  150 - 450 K/uL Final    MPV 05/07/2024 9.7  9.4 - 12.3 FL Final    nRBC 05/07/2024 0.00  0.0 - 0.2 K/uL Final    **Note: Absolute NRBC parameter is now reported with Hemogram**    Neutrophils % 05/07/2024 59  43 - 78 % Final    Lymphocytes % 05/07/2024 19  13 - 44 % Final    Monocytes % 05/07/2024 13 (H)  4.0 - 12.0 % Final    Eosinophils % 05/07/2024 8 (H)  0.5 - 7.8 % Final    Basophils % 05/07/2024 1  0.0 - 2.0 % Final    Immature Granulocytes % 05/07/2024 0  0.0 - 5.0 % Final    Neutrophils Absolute 05/07/2024 4.1  1.7 - 8.2 K/UL Final    Lymphocytes Absolute 05/07/2024 1.3  0.5 - 4.6 K/UL Final    Monocytes Absolute 05/07/2024 0.9  0.1 - 1.3 K/UL Final    Eosinophils Absolute 05/07/2024 0.5  0.0 - 0.8 K/UL Final    Basophils Absolute 05/07/2024 0.1  0.0 - 0.2 K/UL Final    Immature Granulocytes Absolute 05/07/2024 0.0  0.0 - 0.5 K/UL Final    Differential Type 05/07/2024 AUTOMATED    Final    Sodium 05/07/2024 135 (L)  136 - 145 mmol/L Final    Potassium

## 2024-06-17 ENCOUNTER — OFFICE VISIT (OUTPATIENT)
Dept: INTERNAL MEDICINE CLINIC | Facility: CLINIC | Age: 69
End: 2024-06-17
Payer: MEDICARE

## 2024-06-17 VITALS
BODY MASS INDEX: 36.96 KG/M2 | HEART RATE: 64 BPM | OXYGEN SATURATION: 95 % | SYSTOLIC BLOOD PRESSURE: 157 MMHG | DIASTOLIC BLOOD PRESSURE: 92 MMHG | TEMPERATURE: 97.7 F | HEIGHT: 71 IN | WEIGHT: 264 LBS

## 2024-06-17 DIAGNOSIS — H60.333 ACUTE SWIMMER'S EAR OF BOTH SIDES: Primary | ICD-10-CM

## 2024-06-17 PROCEDURE — 4130F TOPICAL PREP RX AOE: CPT | Performed by: INTERNAL MEDICINE

## 2024-06-17 PROCEDURE — 99213 OFFICE O/P EST LOW 20 MIN: CPT | Performed by: INTERNAL MEDICINE

## 2024-06-17 PROCEDURE — 1123F ACP DISCUSS/DSCN MKR DOCD: CPT | Performed by: INTERNAL MEDICINE

## 2024-06-17 PROCEDURE — G8427 DOCREV CUR MEDS BY ELIG CLIN: HCPCS | Performed by: INTERNAL MEDICINE

## 2024-06-17 PROCEDURE — G8417 CALC BMI ABV UP PARAM F/U: HCPCS | Performed by: INTERNAL MEDICINE

## 2024-06-17 PROCEDURE — 1036F TOBACCO NON-USER: CPT | Performed by: INTERNAL MEDICINE

## 2024-06-17 PROCEDURE — 3017F COLORECTAL CA SCREEN DOC REV: CPT | Performed by: INTERNAL MEDICINE

## 2024-06-17 SDOH — ECONOMIC STABILITY: FOOD INSECURITY: WITHIN THE PAST 12 MONTHS, YOU WORRIED THAT YOUR FOOD WOULD RUN OUT BEFORE YOU GOT MONEY TO BUY MORE.: NEVER TRUE

## 2024-06-17 SDOH — ECONOMIC STABILITY: FOOD INSECURITY: WITHIN THE PAST 12 MONTHS, THE FOOD YOU BOUGHT JUST DIDN'T LAST AND YOU DIDN'T HAVE MONEY TO GET MORE.: NEVER TRUE

## 2024-06-17 SDOH — ECONOMIC STABILITY: INCOME INSECURITY: HOW HARD IS IT FOR YOU TO PAY FOR THE VERY BASICS LIKE FOOD, HOUSING, MEDICAL CARE, AND HEATING?: NOT HARD AT ALL

## 2024-06-17 ASSESSMENT — ANXIETY QUESTIONNAIRES
7. FEELING AFRAID AS IF SOMETHING AWFUL MIGHT HAPPEN: NOT AT ALL
IF YOU CHECKED OFF ANY PROBLEMS ON THIS QUESTIONNAIRE, HOW DIFFICULT HAVE THESE PROBLEMS MADE IT FOR YOU TO DO YOUR WORK, TAKE CARE OF THINGS AT HOME, OR GET ALONG WITH OTHER PEOPLE: NOT DIFFICULT AT ALL
4. TROUBLE RELAXING: NOT AT ALL
3. WORRYING TOO MUCH ABOUT DIFFERENT THINGS: NOT AT ALL
5. BEING SO RESTLESS THAT IT IS HARD TO SIT STILL: NOT AT ALL
6. BECOMING EASILY ANNOYED OR IRRITABLE: NOT AT ALL
1. FEELING NERVOUS, ANXIOUS, OR ON EDGE: NOT AT ALL
2. NOT BEING ABLE TO STOP OR CONTROL WORRYING: NOT AT ALL
GAD7 TOTAL SCORE: 0

## 2024-06-17 ASSESSMENT — PATIENT HEALTH QUESTIONNAIRE - PHQ9
SUM OF ALL RESPONSES TO PHQ QUESTIONS 1-9: 0
SUM OF ALL RESPONSES TO PHQ QUESTIONS 1-9: 0
2. FEELING DOWN, DEPRESSED OR HOPELESS: NOT AT ALL
SUM OF ALL RESPONSES TO PHQ QUESTIONS 1-9: 0
SUM OF ALL RESPONSES TO PHQ QUESTIONS 1-9: 0
SUM OF ALL RESPONSES TO PHQ9 QUESTIONS 1 & 2: 0

## 2024-06-17 NOTE — PROGRESS NOTES
[facility]  Santosh Rosales M.D.  Internal Medicine  [address] [place] [address]  Office : [phone]  Fax : [phone]    Chief Complaint   Patient presents with    Otalgia     Left ear pain x 2 weeks        History of Present Illness:  Yair Gee is a 68 y.o. male.  Otalgia   There is pain in the left ear. This is a new problem. The current episode started 1 to 4 weeks ago. The problem occurs constantly. The problem has been gradually worsening. There has been no fever. The pain is mild. Associated symptoms include ear discharge. Pertinent negatives include no headaches, hearing loss or rash. He has tried nothing for the symptoms. The treatment provided no relief.   He was swimming in the pool at [employer]        Past Medical History:  Past Medical History:   Diagnosis Date    Cancer (HCC)     Hypertension     Type 2 diabetes mellitus without complication (HCC)      Past Surgical History:  Past Surgical History:   Procedure Laterality Date    COLONOSCOPY  2022    PANCREAS BIOPSY      UMBILICAL HERNIA REPAIR       Allergies:   Allergies   Allergen Reactions    Penicillins Rash    Shellfish Allergy Nausea And Vomiting     Medications:   Current Outpatient Medications   Medication Sig Dispense Refill    neomycin-polymyxin-hydrocortisone (CORTISPORIN) 3.5-93734-6 otic solution Place 4 drops into both ears 3 times daily for 10 days 10 mL 1    lisinopril (PRINIVIL;ZESTRIL) 40 MG tablet Take 1 tablet by mouth daily 90 tablet 1    metFORMIN (GLUCOPHAGE-XR) 750 MG extended release tablet TAKE 1 TABLET BY MOUTH EVERY DAY WITH EVENING MEAL 90 tablet 1    carvedilol (COREG) 6.25 MG tablet Take 1 tablet by mouth 2 times daily (with meals) 180 tablet 1    hydrALAZINE (APRESOLINE) 50 MG tablet Take 1 tablet by mouth 3 times daily 270 tablet 1    spironolactone (ALDACTONE) 25 MG tablet Take 1 tablet by mouth daily 90 tablet 1    Omega-3 Fatty Acids (FISH OIL)

## 2024-07-09 ENCOUNTER — OFFICE VISIT (OUTPATIENT)
Dept: INTERNAL MEDICINE CLINIC | Facility: CLINIC | Age: 69
End: 2024-07-09
Payer: MEDICARE

## 2024-07-09 VITALS
DIASTOLIC BLOOD PRESSURE: 79 MMHG | HEIGHT: 71 IN | TEMPERATURE: 98.1 F | WEIGHT: 261 LBS | SYSTOLIC BLOOD PRESSURE: 118 MMHG | BODY MASS INDEX: 36.54 KG/M2 | HEART RATE: 70 BPM | OXYGEN SATURATION: 97 %

## 2024-07-09 DIAGNOSIS — E66.01 SEVERE OBESITY (BMI 35.0-39.9) WITH COMORBIDITY (HCC): ICD-10-CM

## 2024-07-09 DIAGNOSIS — E11.9 TYPE 2 DIABETES MELLITUS WITHOUT COMPLICATION, WITHOUT LONG-TERM CURRENT USE OF INSULIN (HCC): ICD-10-CM

## 2024-07-09 DIAGNOSIS — I10 ESSENTIAL HYPERTENSION: ICD-10-CM

## 2024-07-09 DIAGNOSIS — C43.62 MELANOMA OF LEFT UPPER ARM (HCC): ICD-10-CM

## 2024-07-09 DIAGNOSIS — E11.9 TYPE 2 DIABETES MELLITUS WITHOUT COMPLICATION, WITHOUT LONG-TERM CURRENT USE OF INSULIN (HCC): Primary | ICD-10-CM

## 2024-07-09 LAB
ANION GAP SERPL CALC-SCNC: 11 MMOL/L (ref 9–18)
BUN SERPL-MCNC: 26 MG/DL (ref 8–23)
CALCIUM SERPL-MCNC: 9.8 MG/DL (ref 8.8–10.2)
CHLORIDE SERPL-SCNC: 103 MMOL/L (ref 98–107)
CO2 SERPL-SCNC: 21 MMOL/L (ref 20–28)
CREAT SERPL-MCNC: 1.11 MG/DL (ref 0.8–1.3)
EST. AVERAGE GLUCOSE BLD GHB EST-MCNC: 159 MG/DL
GLUCOSE SERPL-MCNC: 156 MG/DL (ref 70–99)
HBA1C MFR BLD: 7.2 % (ref 0–5.6)
POTASSIUM SERPL-SCNC: 5.5 MMOL/L (ref 3.5–5.1)
SODIUM SERPL-SCNC: 135 MMOL/L (ref 136–145)

## 2024-07-09 PROCEDURE — 1123F ACP DISCUSS/DSCN MKR DOCD: CPT | Performed by: INTERNAL MEDICINE

## 2024-07-09 PROCEDURE — 2022F DILAT RTA XM EVC RTNOPTHY: CPT | Performed by: INTERNAL MEDICINE

## 2024-07-09 PROCEDURE — G8417 CALC BMI ABV UP PARAM F/U: HCPCS | Performed by: INTERNAL MEDICINE

## 2024-07-09 PROCEDURE — 1036F TOBACCO NON-USER: CPT | Performed by: INTERNAL MEDICINE

## 2024-07-09 PROCEDURE — G8427 DOCREV CUR MEDS BY ELIG CLIN: HCPCS | Performed by: INTERNAL MEDICINE

## 2024-07-09 PROCEDURE — G2211 COMPLEX E/M VISIT ADD ON: HCPCS | Performed by: INTERNAL MEDICINE

## 2024-07-09 PROCEDURE — 3074F SYST BP LT 130 MM HG: CPT | Performed by: INTERNAL MEDICINE

## 2024-07-09 PROCEDURE — 3078F DIAST BP <80 MM HG: CPT | Performed by: INTERNAL MEDICINE

## 2024-07-09 PROCEDURE — 99214 OFFICE O/P EST MOD 30 MIN: CPT | Performed by: INTERNAL MEDICINE

## 2024-07-09 PROCEDURE — 3044F HG A1C LEVEL LT 7.0%: CPT | Performed by: INTERNAL MEDICINE

## 2024-07-09 PROCEDURE — 3017F COLORECTAL CA SCREEN DOC REV: CPT | Performed by: INTERNAL MEDICINE

## 2024-07-09 RX ORDER — CARVEDILOL 6.25 MG/1
6.25 TABLET ORAL 2 TIMES DAILY WITH MEALS
Qty: 180 TABLET | Refills: 1 | Status: SHIPPED | OUTPATIENT
Start: 2024-07-09

## 2024-07-09 RX ORDER — LISINOPRIL 40 MG/1
40 TABLET ORAL DAILY
Qty: 90 TABLET | Refills: 1 | Status: SHIPPED | OUTPATIENT
Start: 2024-07-09

## 2024-07-09 RX ORDER — SPIRONOLACTONE 25 MG/1
25 TABLET ORAL DAILY
Qty: 90 TABLET | Refills: 1 | Status: SHIPPED | OUTPATIENT
Start: 2024-07-09

## 2024-07-09 RX ORDER — METFORMIN HYDROCHLORIDE 750 MG/1
TABLET, EXTENDED RELEASE ORAL
Qty: 90 TABLET | Refills: 1 | Status: SHIPPED | OUTPATIENT
Start: 2024-07-09

## 2024-07-09 RX ORDER — HYDRALAZINE HYDROCHLORIDE 50 MG/1
50 TABLET, FILM COATED ORAL 3 TIMES DAILY
Qty: 270 TABLET | Refills: 1 | Status: SHIPPED | OUTPATIENT
Start: 2024-07-09

## 2024-07-09 SDOH — ECONOMIC STABILITY: FOOD INSECURITY: WITHIN THE PAST 12 MONTHS, THE FOOD YOU BOUGHT JUST DIDN'T LAST AND YOU DIDN'T HAVE MONEY TO GET MORE.: NEVER TRUE

## 2024-07-09 SDOH — ECONOMIC STABILITY: FOOD INSECURITY: WITHIN THE PAST 12 MONTHS, YOU WORRIED THAT YOUR FOOD WOULD RUN OUT BEFORE YOU GOT MONEY TO BUY MORE.: NEVER TRUE

## 2024-07-09 SDOH — ECONOMIC STABILITY: INCOME INSECURITY: HOW HARD IS IT FOR YOU TO PAY FOR THE VERY BASICS LIKE FOOD, HOUSING, MEDICAL CARE, AND HEATING?: NOT HARD AT ALL

## 2024-07-09 ASSESSMENT — ANXIETY QUESTIONNAIRES
6. BECOMING EASILY ANNOYED OR IRRITABLE: NOT AT ALL
IF YOU CHECKED OFF ANY PROBLEMS ON THIS QUESTIONNAIRE, HOW DIFFICULT HAVE THESE PROBLEMS MADE IT FOR YOU TO DO YOUR WORK, TAKE CARE OF THINGS AT HOME, OR GET ALONG WITH OTHER PEOPLE: NOT DIFFICULT AT ALL
GAD7 TOTAL SCORE: 0
2. NOT BEING ABLE TO STOP OR CONTROL WORRYING: NOT AT ALL
1. FEELING NERVOUS, ANXIOUS, OR ON EDGE: NOT AT ALL
3. WORRYING TOO MUCH ABOUT DIFFERENT THINGS: NOT AT ALL
5. BEING SO RESTLESS THAT IT IS HARD TO SIT STILL: NOT AT ALL
7. FEELING AFRAID AS IF SOMETHING AWFUL MIGHT HAPPEN: NOT AT ALL
4. TROUBLE RELAXING: NOT AT ALL

## 2024-07-09 ASSESSMENT — PATIENT HEALTH QUESTIONNAIRE - PHQ9
1. LITTLE INTEREST OR PLEASURE IN DOING THINGS: NOT AT ALL
SUM OF ALL RESPONSES TO PHQ QUESTIONS 1-9: 0
2. FEELING DOWN, DEPRESSED OR HOPELESS: NOT AT ALL
SUM OF ALL RESPONSES TO PHQ9 QUESTIONS 1 & 2: 0
SUM OF ALL RESPONSES TO PHQ QUESTIONS 1-9: 0

## 2024-07-09 ASSESSMENT — ENCOUNTER SYMPTOMS: SHORTNESS OF BREATH: 0

## 2024-07-09 NOTE — PROGRESS NOTES
[facility]  Santosh Rosales M.D.  Internal Medicine  [address] [place] [address]  Office : [phone]  Fax : [phone]    Chief Complaint   Patient presents with    Diabetes     3 mo follow up       History of Present Illness:  Yair Gee is a 68 y.o. male.  HPI    Diabetes Mellitus  Patient presents  for follow up on diabetes.  Onset of symptoms was several years ago. Patient describes symptoms as none. Course to date has been well controlled.Diet and Lifestyle: follows a diabetic diet regularly  exercises sporadically  nonsmoker  Home glucose monitoring:  Results average n/a. Patient denies polyuria and polydipsia. No wounds in lower limbs.  Most recent HbA1c was   Hemoglobin A1C   Date Value Ref Range Status   01/10/2024 6.5 (H) 4.8 - 5.6 % Final       Hypertension  Patient is in for Hypertension which is stable.    Diet and Lifestyle: generally follows a low sodium diet  Home BP Monitoring: Reviewed blood pressure diary with patient at todays visit..  Patient's recent blood pressures were   BP Readings from Last 3 Encounters:   07/09/24 118/79   06/17/24 (!) 157/92   05/07/24 (!) 181/94   .   taking medications as instructed, no medication side effects noted, no TIA's, no chest pain on exertion, no dyspnea on exertion, no swelling of ankles. Cardiac risk factors consist of advanced age (older than 55 for men, 65 for women), diabetes mellitus, hypertension, and male gender.  Lab Results   Component Value Date/Time     05/07/2024 12:10 PM    K 5.3 05/07/2024 12:10 PM     05/07/2024 12:10 PM    CO2 25 05/07/2024 12:10 PM    BUN 22 05/07/2024 12:10 PM    CREATININE 0.94 05/07/2024 12:10 PM    GLUCOSE 144 05/07/2024 12:10 PM    CALCIUM 9.3 05/07/2024 12:10 PM    LABGLOM 88 05/07/2024 12:10 PM    LABGLOM 59 02/06/2024 01:08 PM        Melanoma skin cancer  Stage 3 in left arm that was present in a lymph node in 2018.  It was found again

## 2024-07-10 PROBLEM — E11.9 TYPE 2 DIABETES MELLITUS WITHOUT COMPLICATION (HCC): Status: ACTIVE | Noted: 2024-07-10

## 2024-07-10 PROBLEM — I10 HYPERTENSION: Status: ACTIVE | Noted: 2024-07-10

## 2024-07-12 ENCOUNTER — TELEPHONE (OUTPATIENT)
Dept: INTERNAL MEDICINE CLINIC | Facility: CLINIC | Age: 69
End: 2024-07-12

## 2024-07-12 DIAGNOSIS — I10 ESSENTIAL HYPERTENSION: Primary | ICD-10-CM

## 2024-07-12 RX ORDER — HYDROCHLOROTHIAZIDE 12.5 MG/1
12.5 CAPSULE, GELATIN COATED ORAL EVERY MORNING
Qty: 90 CAPSULE | Refills: 1 | Status: SHIPPED | OUTPATIENT
Start: 2024-07-12

## 2024-07-12 NOTE — TELEPHONE ENCOUNTER
----- Message from Santosh Rosales MD sent at 7/10/2024  7:04 AM EDT -----  I have reviewed all lab results which are normal or stable with acceptable control of diabetes except potassium is increasing so would stop spironolactone and start hydrochlorothiazide 12.5 mg daily. Please inform the patient.

## 2024-08-06 ENCOUNTER — TELEPHONE (OUTPATIENT)
Dept: ONCOLOGY | Age: 69
End: 2024-08-06

## 2024-08-06 NOTE — TELEPHONE ENCOUNTER
Returned call to pt. Pt c/o small lump in left axilla about the size of the tip of pinky finger that has been present around 4 days. Per pt, it is hard and immobile. No c/o tenderness, recent infection, fever, injections/immunizations. Message sent to Nps.

## 2024-08-06 NOTE — TELEPHONE ENCOUNTER
Spoke to pt. Per Evelyn TURCIOS, arrange for OV with Dr. Bone. Will have scheduling call pt to make appt. Maciel SINGH.

## 2024-08-06 NOTE — TELEPHONE ENCOUNTER
Patient would like to schedule appointment as he has developed a lump and would like an appointment

## 2024-08-20 ENCOUNTER — OFFICE VISIT (OUTPATIENT)
Dept: ONCOLOGY | Age: 69
End: 2024-08-20
Payer: MEDICARE

## 2024-08-20 ENCOUNTER — HOSPITAL ENCOUNTER (OUTPATIENT)
Dept: LAB | Age: 69
Discharge: HOME | End: 2024-08-23
Payer: MEDICARE

## 2024-08-20 VITALS
BODY MASS INDEX: 36.08 KG/M2 | OXYGEN SATURATION: 98 % | RESPIRATION RATE: 18 BRPM | HEIGHT: 72 IN | HEART RATE: 59 BPM | WEIGHT: 266.4 LBS | DIASTOLIC BLOOD PRESSURE: 91 MMHG | TEMPERATURE: 97.6 F | SYSTOLIC BLOOD PRESSURE: 165 MMHG

## 2024-08-20 DIAGNOSIS — C43.62 MELANOMA OF LEFT UPPER ARM (HCC): ICD-10-CM

## 2024-08-20 DIAGNOSIS — C43.9 METASTATIC MELANOMA (HCC): ICD-10-CM

## 2024-08-20 DIAGNOSIS — C43.62 MELANOMA OF LEFT UPPER ARM (HCC): Primary | ICD-10-CM

## 2024-08-20 LAB
ALBUMIN SERPL-MCNC: 3.9 G/DL (ref 3.2–4.6)
ALBUMIN/GLOB SERPL: 1.2 (ref 1–1.9)
ALP SERPL-CCNC: 67 U/L (ref 40–129)
ALT SERPL-CCNC: 27 U/L (ref 12–65)
ANION GAP SERPL CALC-SCNC: 10 MMOL/L (ref 9–18)
AST SERPL-CCNC: 26 U/L (ref 15–37)
BASOPHILS # BLD: 0.1 K/UL (ref 0–0.2)
BASOPHILS NFR BLD: 1 % (ref 0–2)
BILIRUB SERPL-MCNC: 0.3 MG/DL (ref 0–1.2)
BUN SERPL-MCNC: 23 MG/DL (ref 8–23)
CALCIUM SERPL-MCNC: 9.9 MG/DL (ref 8.8–10.2)
CHLORIDE SERPL-SCNC: 101 MMOL/L (ref 98–107)
CO2 SERPL-SCNC: 25 MMOL/L (ref 20–28)
CREAT SERPL-MCNC: 0.96 MG/DL (ref 0.8–1.3)
DIFFERENTIAL METHOD BLD: ABNORMAL
EOSINOPHIL # BLD: 0.7 K/UL (ref 0–0.8)
EOSINOPHIL NFR BLD: 9 % (ref 0.5–7.8)
ERYTHROCYTE [DISTWIDTH] IN BLOOD BY AUTOMATED COUNT: 13.5 % (ref 11.9–14.6)
GLOBULIN SER CALC-MCNC: 3.2 G/DL (ref 2.3–3.5)
GLUCOSE SERPL-MCNC: 175 MG/DL (ref 70–99)
HCT VFR BLD AUTO: 39.2 % (ref 41.1–50.3)
HGB BLD-MCNC: 13.4 G/DL (ref 13.6–17.2)
IMM GRANULOCYTES # BLD AUTO: 0 K/UL (ref 0–0.5)
IMM GRANULOCYTES NFR BLD AUTO: 0 % (ref 0–5)
LYMPHOCYTES # BLD: 1.3 K/UL (ref 0.5–4.6)
LYMPHOCYTES NFR BLD: 18 % (ref 13–44)
MCH RBC QN AUTO: 33.6 PG (ref 26.1–32.9)
MCHC RBC AUTO-ENTMCNC: 34.2 G/DL (ref 31.4–35)
MCV RBC AUTO: 98.2 FL (ref 82–102)
MONOCYTES # BLD: 1 K/UL (ref 0.1–1.3)
MONOCYTES NFR BLD: 13 % (ref 4–12)
NEUTS SEG # BLD: 4.3 K/UL (ref 1.7–8.2)
NEUTS SEG NFR BLD: 59 % (ref 43–78)
NRBC # BLD: 0 K/UL (ref 0–0.2)
PLATELET # BLD AUTO: 251 K/UL (ref 150–450)
PMV BLD AUTO: 10 FL (ref 9.4–12.3)
POTASSIUM SERPL-SCNC: 4.7 MMOL/L (ref 3.5–5.1)
PROT SERPL-MCNC: 7.1 G/DL (ref 6.3–8.2)
RBC # BLD AUTO: 3.99 M/UL (ref 4.23–5.6)
SODIUM SERPL-SCNC: 136 MMOL/L (ref 136–145)
WBC # BLD AUTO: 7.3 K/UL (ref 4.3–11.1)

## 2024-08-20 PROCEDURE — 99214 OFFICE O/P EST MOD 30 MIN: CPT | Performed by: INTERNAL MEDICINE

## 2024-08-20 PROCEDURE — 80053 COMPREHEN METABOLIC PANEL: CPT

## 2024-08-20 PROCEDURE — 3077F SYST BP >= 140 MM HG: CPT | Performed by: INTERNAL MEDICINE

## 2024-08-20 PROCEDURE — G8417 CALC BMI ABV UP PARAM F/U: HCPCS | Performed by: INTERNAL MEDICINE

## 2024-08-20 PROCEDURE — 1123F ACP DISCUSS/DSCN MKR DOCD: CPT | Performed by: INTERNAL MEDICINE

## 2024-08-20 PROCEDURE — 1036F TOBACCO NON-USER: CPT | Performed by: INTERNAL MEDICINE

## 2024-08-20 PROCEDURE — 85025 COMPLETE CBC W/AUTO DIFF WBC: CPT

## 2024-08-20 PROCEDURE — 36415 COLL VENOUS BLD VENIPUNCTURE: CPT

## 2024-08-20 PROCEDURE — 3080F DIAST BP >= 90 MM HG: CPT | Performed by: INTERNAL MEDICINE

## 2024-08-20 PROCEDURE — 3017F COLORECTAL CA SCREEN DOC REV: CPT | Performed by: INTERNAL MEDICINE

## 2024-08-20 PROCEDURE — G8427 DOCREV CUR MEDS BY ELIG CLIN: HCPCS | Performed by: INTERNAL MEDICINE

## 2024-08-20 ASSESSMENT — PATIENT HEALTH QUESTIONNAIRE - PHQ9
SUM OF ALL RESPONSES TO PHQ QUESTIONS 1-9: 0
SUM OF ALL RESPONSES TO PHQ9 QUESTIONS 1 & 2: 0
1. LITTLE INTEREST OR PLEASURE IN DOING THINGS: NOT AT ALL
SUM OF ALL RESPONSES TO PHQ QUESTIONS 1-9: 0
2. FEELING DOWN, DEPRESSED OR HOPELESS: NOT AT ALL

## 2024-08-20 NOTE — PROGRESS NOTES
[facility] Hematology & Oncology: Office Visit Progress Note    Chief Complaint:    Left arm melanoma  Left axilla metastasis  Left axillary relapse    History of Present Illness:  Referral Diagnosis: Melanoma of left upper arm     Referring Provider: Santosh Rosales MD     Primary Care Provider: Santosh Rosales MD     Family History of Cancer/ Hematology Disorders: No known family history      Presenting Symptoms: Melanoma of left upper arm      68 y.o. white male     2/22/18 - DERMATOPATHOLOGY REPORT (CE)  Final Diagnosis  SKIN, LABELED AS \"LEFT UPPER ARM,\" [id number]  MALIGNANT MELANOMA, INVASIVE TUMOR THICKNESS (BRESLOW DEPTH): 0.9 AT LEAST ULCERATION: NOT IDENTIFIED  MITOTIC RATE: 1 PER SQUARE MILLIMETER  LYMPHOVASCULAR INVASION: NOT IDENTIFIED  PERINEURAL INVASION: NOT IDENTIFIED  TUMOR INFILTRATING LYMPHOCYTES: NON-BRISK  TUMOR REGRESSION: NOT IDENTIFIED  MICROSATELLITOSIS: NOT IDENTIFIED  SURGICAL MARGINS: INVOLVED BY MELANOMA  ASSOCIATED MELANOCYTIC NEVUS: NOT IDENTIFIED  PATHOLOGIC T STAGE: pT1b AT LEAST                         3/20/18 - NM LYMPHOSCINTIGRAM (CE)  IMPRESSION:  A sentinel lymph node in the left axilla was identified and marked.     4/10/18 - PET CT Whole Body Melanoma (CE)  IMPRESSION:  No scintigraphic evidence of metastatic disease.  Postsurgical changes of a wide local excision in the proximal left upper extremity, as well as a left axillary nicole dissection with a non-hypermetabolic fluid collection in the left axilla, which is compatible with a seroma.     5/3/18 - CYTOGENETICS REPORT (CE)  Final Diagnosis  B. LYMPH NODES, LABELED AS \"527 BLUE,\" EXCISION ([facility] [id number]):  Variants Detected (Variant Summary)  Classification 1: Strong clinical significance  BRAF Exon 15 missense_variant: VAF=12% p.V600E  Classification 2: Potential clinical significance  No Classification 2 Mutations Detected  Classification 3: Unknown clinical significance  No Classification 3

## 2024-08-20 NOTE — PATIENT INSTRUCTIONS
Patient Information from Today's Visit    The members of your [facility] are listed below:    Physician Provider: Marino Bone Medical Oncologist  Advanced Practice Clinician: Cecile Man NP  Registered Nurse: Keira NAIK   Navigator: N/A  Medical Assistant: Myrtle NEVES MA  : Rea VILLASENOR   Supportive Care Services: Monica CLEMENT LMSW    Diagnosis: History of melanoma      Follow Up Instructions: Will refer you to Dr. Tate, surgeon, at [facility]. Their number is [phone]. They should call you but you can also call them. Please let us know if Dr. Tate does a biopsy.      Treatment Summary has been discussed and given to patient:No      Current Labs:   Hospital Outpatient Visit on 08/20/2024   Component Date Value Ref Range Status    Sodium 08/20/2024 136  136 - 145 mmol/L Final    Potassium 08/20/2024 4.7  3.5 - 5.1 mmol/L Final    Chloride 08/20/2024 101  98 - 107 mmol/L Final    CO2 08/20/2024 25  20 - 28 mmol/L Final    Anion Gap 08/20/2024 10  9 - 18 mmol/L Final    Glucose 08/20/2024 175 (H)  70 - 99 mg/dL Final    Comment: <70 mg/dL Consistent with, but not fully diagnostic of hypoglycemia.  100 - 125 mg/dL Impaired fasting glucose/consistent with pre-diabetes mellitus.  > 126 mg/dl Fasting glucose consistent with overt diabetes mellitus      BUN 08/20/2024 23  8 - 23 MG/DL Final    Creatinine 08/20/2024 0.96  0.80 - 1.30 MG/DL Final    Est, Glom Filt Rate 08/20/2024 86  >60 ml/min/1.73m2 Final    Comment:    Pediatric calculator link: https://www.kidney.org/professionals/kdoqi/gfr_calculatorped     These results are not intended for use in patients <18 years of age.     eGFR results are calculated without a race factor using  the 2021 CKD-EPI equation. Careful clinical correlation is recommended, particularly when comparing to results calculated using previous equations.  The CKD-EPI equation is less accurate in patients with extremes of muscle mass, extra-renal metabolism of creatinine,

## 2024-09-10 ENCOUNTER — PREP FOR PROCEDURE (OUTPATIENT)
Dept: SURGERY | Age: 69
End: 2024-09-10

## 2024-09-10 ENCOUNTER — OFFICE VISIT (OUTPATIENT)
Dept: SURGERY | Age: 69
End: 2024-09-10
Payer: MEDICARE

## 2024-09-10 VITALS
WEIGHT: 266 LBS | HEART RATE: 109 BPM | OXYGEN SATURATION: 98 % | BODY MASS INDEX: 36.03 KG/M2 | SYSTOLIC BLOOD PRESSURE: 132 MMHG | DIASTOLIC BLOOD PRESSURE: 80 MMHG | HEIGHT: 72 IN

## 2024-09-10 DIAGNOSIS — R22.32 AXILLARY MASS, LEFT: Primary | ICD-10-CM

## 2024-09-10 PROCEDURE — 3079F DIAST BP 80-89 MM HG: CPT | Performed by: SURGERY

## 2024-09-10 PROCEDURE — G8417 CALC BMI ABV UP PARAM F/U: HCPCS | Performed by: SURGERY

## 2024-09-10 PROCEDURE — 3075F SYST BP GE 130 - 139MM HG: CPT | Performed by: SURGERY

## 2024-09-10 PROCEDURE — 1123F ACP DISCUSS/DSCN MKR DOCD: CPT | Performed by: SURGERY

## 2024-09-10 PROCEDURE — 1036F TOBACCO NON-USER: CPT | Performed by: SURGERY

## 2024-09-10 PROCEDURE — 99204 OFFICE O/P NEW MOD 45 MIN: CPT | Performed by: SURGERY

## 2024-09-10 PROCEDURE — G8427 DOCREV CUR MEDS BY ELIG CLIN: HCPCS | Performed by: SURGERY

## 2024-09-10 PROCEDURE — 3017F COLORECTAL CA SCREEN DOC REV: CPT | Performed by: SURGERY

## 2024-09-19 RX ORDER — SODIUM CHLORIDE 0.9 % (FLUSH) 0.9 %
5-40 SYRINGE (ML) INJECTION PRN
Status: CANCELLED | OUTPATIENT
Start: 2024-09-19

## 2024-09-19 RX ORDER — SODIUM CHLORIDE 9 MG/ML
INJECTION, SOLUTION INTRAVENOUS PRN
Status: CANCELLED | OUTPATIENT
Start: 2024-09-19

## 2024-09-19 RX ORDER — SODIUM CHLORIDE 0.9 % (FLUSH) 0.9 %
5-40 SYRINGE (ML) INJECTION EVERY 12 HOURS SCHEDULED
Status: CANCELLED | OUTPATIENT
Start: 2024-09-19

## 2024-10-02 ENCOUNTER — HOSPITAL ENCOUNTER (OUTPATIENT)
Dept: SURGERY | Age: 69
Discharge: HOME | End: 2024-10-05
Payer: MEDICARE

## 2024-10-02 VITALS
HEIGHT: 72 IN | WEIGHT: 264 LBS | BODY MASS INDEX: 35.76 KG/M2 | SYSTOLIC BLOOD PRESSURE: 156 MMHG | OXYGEN SATURATION: 95 % | RESPIRATION RATE: 16 BRPM | TEMPERATURE: 97.8 F | DIASTOLIC BLOOD PRESSURE: 92 MMHG | HEART RATE: 67 BPM

## 2024-10-02 DIAGNOSIS — Z01.818 PREOP TESTING: Primary | ICD-10-CM

## 2024-10-02 DIAGNOSIS — R22.32 AXILLARY MASS, LEFT: ICD-10-CM

## 2024-10-02 LAB
EKG ATRIAL RATE: 60 BPM
EKG DIAGNOSIS: NORMAL
EKG P AXIS: 2 DEGREES
EKG P-R INTERVAL: 214 MS
EKG Q-T INTERVAL: 414 MS
EKG QRS DURATION: 104 MS
EKG QTC CALCULATION (BAZETT): 414 MS
EKG R AXIS: -17 DEGREES
EKG T AXIS: 128 DEGREES
EKG VENTRICULAR RATE: 60 BPM
GLUCOSE BLD STRIP.AUTO-MCNC: 170 MG/DL (ref 65–100)
INR PPP: 1.1
PROTHROMBIN TIME: 13.4 SEC (ref 11.3–14.9)
SERVICE CMNT-IMP: ABNORMAL

## 2024-10-02 PROCEDURE — 82962 GLUCOSE BLOOD TEST: CPT

## 2024-10-02 PROCEDURE — 85610 PROTHROMBIN TIME: CPT

## 2024-10-02 NOTE — PERIOP NOTE
Patient verified name and     Order for consent found in EHR and matches case posting; patient verified.     Type 1B surgery, PAT walk-in assessment complete.    Labs per surgeon: PT/INR; results pending  Labs per anesthesia protocol: POC Glucose 170, K+; results pending  EKG: 10/02/2024- WNL per anesthesia protocol      Patient provided with and instructed on educational handouts including Guide to Surgery, Preventing Surgical Site Infections, Pain Management, and [facility] Brochure.    Patient answered medical/surgical history questions at their best of ability. All prior to admission medications documented in EPIC. Original medication prescription bottles not visualized during patient appointment.     Patient instructed to hold all vitamins 7 days prior to surgery and NSAIDS 5 days prior to surgery, patient verbalized understanding.     Patient teach back successful and patient demonstrates knowledge of instructions.             PLEASE CONTINUE TAKING ALL PRESCRIPTION MEDICATIONS UP TO THE DAY OF SURGERY UNLESS OTHERWISE DIRECTED BELOW. You may take Tylenol, allergy,  and/or indigestion medications.     TAKE ONLY THESE MEDICATIONS ON THE DAY OF SURGERY      Hydralazine     Carvedilol        DISCONTINUE all vitamins and supplements 7 days prior to surgery. DISCONTINUE Non-Steroidal Anti-Inflammatory (NSAIDS) such as Advil and Aleve 5 days prior to surgery.     Home Medications to Hold- please continue all other medications except these.      Aspirin 81 mg- hold 5 days prior to surgery.        Comments   Hibiclens shower the night before surgery and the morning of surgery.   On the day before surgery (Wednesday) please take 2 Tylenol in the morning and then again before bed. You may use either regular or extra strength.           Please do not bring home medications with you on the day of surgery unless otherwise directed by your nurse.  If you are instructed to bring home medications, please give

## 2024-10-02 NOTE — PERIOP NOTE
PT/INR within anesthesia guidelines, no follow-up required. Labs automatically routed to ordering provider via Epic documentation.    Per lab, needs a recollect on Potassium lab draw. Patient notified and is coming Monday 10/07/2024. Chart flagged for CN f/u.

## 2024-10-07 ENCOUNTER — HOSPITAL ENCOUNTER (OUTPATIENT)
Dept: LAB | Age: 69
Discharge: HOME | End: 2024-10-10
Payer: MEDICARE

## 2024-10-07 DIAGNOSIS — Z01.818 PREOP TESTING: ICD-10-CM

## 2024-10-07 LAB — POTASSIUM SERPL-SCNC: 5.3 MMOL/L (ref 3.5–5.1)

## 2024-10-07 PROCEDURE — 84132 ASSAY OF SERUM POTASSIUM: CPT

## 2024-10-07 PROCEDURE — 36415 COLL VENOUS BLD VENIPUNCTURE: CPT

## 2024-10-09 ENCOUNTER — OFFICE VISIT (OUTPATIENT)
Dept: INTERNAL MEDICINE CLINIC | Facility: CLINIC | Age: 69
End: 2024-10-09
Payer: MEDICARE

## 2024-10-09 VITALS
HEIGHT: 72 IN | OXYGEN SATURATION: 98 % | HEART RATE: 57 BPM | TEMPERATURE: 97.6 F | SYSTOLIC BLOOD PRESSURE: 153 MMHG | DIASTOLIC BLOOD PRESSURE: 93 MMHG | BODY MASS INDEX: 35.35 KG/M2 | WEIGHT: 261 LBS

## 2024-10-09 DIAGNOSIS — Z00.00 INITIAL MEDICARE ANNUAL WELLNESS VISIT: ICD-10-CM

## 2024-10-09 DIAGNOSIS — C43.62 MELANOMA OF LEFT UPPER ARM (HCC): ICD-10-CM

## 2024-10-09 DIAGNOSIS — I10 ESSENTIAL HYPERTENSION: ICD-10-CM

## 2024-10-09 DIAGNOSIS — E11.9 TYPE 2 DIABETES MELLITUS WITHOUT COMPLICATION, WITHOUT LONG-TERM CURRENT USE OF INSULIN (HCC): Primary | ICD-10-CM

## 2024-10-09 PROCEDURE — 3017F COLORECTAL CA SCREEN DOC REV: CPT | Performed by: INTERNAL MEDICINE

## 2024-10-09 PROCEDURE — 3077F SYST BP >= 140 MM HG: CPT | Performed by: INTERNAL MEDICINE

## 2024-10-09 PROCEDURE — 99214 OFFICE O/P EST MOD 30 MIN: CPT | Performed by: INTERNAL MEDICINE

## 2024-10-09 PROCEDURE — 2022F DILAT RTA XM EVC RTNOPTHY: CPT | Performed by: INTERNAL MEDICINE

## 2024-10-09 PROCEDURE — G8417 CALC BMI ABV UP PARAM F/U: HCPCS | Performed by: INTERNAL MEDICINE

## 2024-10-09 PROCEDURE — G8427 DOCREV CUR MEDS BY ELIG CLIN: HCPCS | Performed by: INTERNAL MEDICINE

## 2024-10-09 PROCEDURE — 3080F DIAST BP >= 90 MM HG: CPT | Performed by: INTERNAL MEDICINE

## 2024-10-09 PROCEDURE — G0438 PPPS, INITIAL VISIT: HCPCS | Performed by: INTERNAL MEDICINE

## 2024-10-09 PROCEDURE — 3051F HG A1C>EQUAL 7.0%<8.0%: CPT | Performed by: INTERNAL MEDICINE

## 2024-10-09 PROCEDURE — 1123F ACP DISCUSS/DSCN MKR DOCD: CPT | Performed by: INTERNAL MEDICINE

## 2024-10-09 PROCEDURE — 1036F TOBACCO NON-USER: CPT | Performed by: INTERNAL MEDICINE

## 2024-10-09 PROCEDURE — G8484 FLU IMMUNIZE NO ADMIN: HCPCS | Performed by: INTERNAL MEDICINE

## 2024-10-09 RX ORDER — SODIUM CHLORIDE, SODIUM LACTATE, POTASSIUM CHLORIDE, CALCIUM CHLORIDE 600; 310; 30; 20 MG/100ML; MG/100ML; MG/100ML; MG/100ML
INJECTION, SOLUTION INTRAVENOUS CONTINUOUS
Status: CANCELLED | OUTPATIENT
Start: 2024-10-09

## 2024-10-09 RX ORDER — HYDROMORPHONE HYDROCHLORIDE 2 MG/ML
0.5 INJECTION, SOLUTION INTRAMUSCULAR; INTRAVENOUS; SUBCUTANEOUS EVERY 5 MIN PRN
Status: CANCELLED | OUTPATIENT
Start: 2024-10-09

## 2024-10-09 RX ORDER — NALOXONE HYDROCHLORIDE 0.4 MG/ML
INJECTION, SOLUTION INTRAMUSCULAR; INTRAVENOUS; SUBCUTANEOUS PRN
Status: CANCELLED | OUTPATIENT
Start: 2024-10-09

## 2024-10-09 RX ORDER — ONDANSETRON 2 MG/ML
4 INJECTION INTRAMUSCULAR; INTRAVENOUS
Status: CANCELLED | OUTPATIENT
Start: 2024-10-09 | End: 2024-10-10

## 2024-10-09 RX ORDER — OXYCODONE HYDROCHLORIDE 5 MG/1
5 TABLET ORAL
Status: CANCELLED | OUTPATIENT
Start: 2024-10-09 | End: 2024-10-10

## 2024-10-09 SDOH — ECONOMIC STABILITY: FOOD INSECURITY: WITHIN THE PAST 12 MONTHS, THE FOOD YOU BOUGHT JUST DIDN'T LAST AND YOU DIDN'T HAVE MONEY TO GET MORE.: NEVER TRUE

## 2024-10-09 SDOH — ECONOMIC STABILITY: FOOD INSECURITY: WITHIN THE PAST 12 MONTHS, YOU WORRIED THAT YOUR FOOD WOULD RUN OUT BEFORE YOU GOT MONEY TO BUY MORE.: NEVER TRUE

## 2024-10-09 SDOH — ECONOMIC STABILITY: INCOME INSECURITY: HOW HARD IS IT FOR YOU TO PAY FOR THE VERY BASICS LIKE FOOD, HOUSING, MEDICAL CARE, AND HEATING?: NOT HARD AT ALL

## 2024-10-09 ASSESSMENT — PATIENT HEALTH QUESTIONNAIRE - PHQ9
SUM OF ALL RESPONSES TO PHQ9 QUESTIONS 1 & 2: 0
SUM OF ALL RESPONSES TO PHQ QUESTIONS 1-9: 0
2. FEELING DOWN, DEPRESSED OR HOPELESS: NOT AT ALL
SUM OF ALL RESPONSES TO PHQ QUESTIONS 1-9: 0
1. LITTLE INTEREST OR PLEASURE IN DOING THINGS: NOT AT ALL
SUM OF ALL RESPONSES TO PHQ QUESTIONS 1-9: 0
SUM OF ALL RESPONSES TO PHQ QUESTIONS 1-9: 0

## 2024-10-09 ASSESSMENT — ANXIETY QUESTIONNAIRES
3. WORRYING TOO MUCH ABOUT DIFFERENT THINGS: NOT AT ALL
5. BEING SO RESTLESS THAT IT IS HARD TO SIT STILL: NOT AT ALL
2. NOT BEING ABLE TO STOP OR CONTROL WORRYING: NOT AT ALL
7. FEELING AFRAID AS IF SOMETHING AWFUL MIGHT HAPPEN: NOT AT ALL
GAD7 TOTAL SCORE: 0
6. BECOMING EASILY ANNOYED OR IRRITABLE: NOT AT ALL
1. FEELING NERVOUS, ANXIOUS, OR ON EDGE: NOT AT ALL
IF YOU CHECKED OFF ANY PROBLEMS ON THIS QUESTIONNAIRE, HOW DIFFICULT HAVE THESE PROBLEMS MADE IT FOR YOU TO DO YOUR WORK, TAKE CARE OF THINGS AT HOME, OR GET ALONG WITH OTHER PEOPLE: NOT DIFFICULT AT ALL
4. TROUBLE RELAXING: NOT AT ALL

## 2024-10-09 ASSESSMENT — ENCOUNTER SYMPTOMS: SHORTNESS OF BREATH: 0

## 2024-10-09 ASSESSMENT — LIFESTYLE VARIABLES
HOW OFTEN DO YOU HAVE A DRINK CONTAINING ALCOHOL: 2-4 TIMES A MONTH
HOW MANY STANDARD DRINKS CONTAINING ALCOHOL DO YOU HAVE ON A TYPICAL DAY: 1 OR 2

## 2024-10-09 NOTE — PROGRESS NOTES
[facility]  Santosh Rosales M.D.  Internal Medicine  [address] [place] [id number]  Office : [phone]  Fax : [phone]    Chief Complaint   Patient presents with    Diabetes     3 mo follow up    Medicare AWV     Initial [facility]       History of Present Illness:  Yair Gee is a 68 y.o. male.  HPI    Diabetes Mellitus  Patient presents  for follow up on diabetes.  Onset of symptoms was several years ago. Patient describes symptoms as none. Course to date has been well controlled.Diet and Lifestyle: follows a diabetic diet regularly  exercises sporadically  nonsmoker  Home glucose monitoring:  Results average n/a. Patient denies polyuria and polydipsia. No wounds in lower limbs.  Most recent HbA1c was   Hemoglobin A1C   Date Value Ref Range Status   07/09/2024 7.2 (H) 0 - 5.6 % Final     Comment:     Reference Range  Normal       <5.7%  Prediabetes  5.7-6.4%  Diabetes     >6.4%         Hypertension  Patient is in for Hypertension which is stable.    Diet and Lifestyle: generally follows a low sodium diet  Home BP Monitoring: is well controlled at home, ranging 130's/79's.  Patient's recent blood pressures were   BP Readings from Last 3 Encounters:   10/09/24 (!) 153/93   10/02/24 (!) 156/92   09/10/24 132/80   .   taking medications as instructed, no medication side effects noted, no TIA's, no chest pain on exertion, no dyspnea on exertion, no swelling of ankles. Cardiac risk factors consist of advanced age (older than 55 for men, 65 for women), diabetes mellitus, hypertension, and male gender.  Lab Results   Component Value Date/Time     08/20/2024 09:13 AM    K 5.3 10/07/2024 10:50 AM     08/20/2024 09:13 AM    CO2 25 08/20/2024 09:13 AM    BUN 23 08/20/2024 09:13 AM    CREATININE 0.96 08/20/2024 09:13 AM    GLUCOSE 175 08/20/2024 09:13 AM    CALCIUM 9.9 08/20/2024 09:13 AM    LABGLOM 86 08/20/2024 09:13 AM    LABGLOM 59 02/06/2024 01:08

## 2024-10-09 NOTE — PROGRESS NOTES
[facility]  Santosh Rosales M.D.  Internal Medicine  [address] [place] [address]  Office : [phone]  Fax : [phone]    CHIEF COMPLAINT  Chief Complaint   Patient presents with    Diabetes     3 mo follow up    Medicare AWV     Initial AWV         Medicare Annual Wellness Visit    Yair Gee is here for Diabetes (3 mo follow up) and Medicare AWV (Initial AWV)    Assessment & Plan   Initial Medicare annual wellness visit  Recommendations for Preventive Services Due: see orders and patient instructions/AVS.  Recommended screening schedule for the next 5-10 years is provided to the patient in written form: see Patient Instructions/AVS.     No follow-ups on file.     Subjective       Patient's complete Health Risk Assessment and screening values have been reviewed and are found in Flowsheets. The following problems were reviewed today and where indicated follow up appointments were made and/or referrals ordered.    Positive Risk Factor Screenings with Interventions:       Cognitive:      Words recalled: 3 Words Recalled     Total Score Interpretation: Normal Mini-Cog  Interventions:  No intervention needed              Abnormal BMI (obese):  Body mass index is 35.89 kg/m². (!) Abnormal  Interventions:  low carbohydrate diet, wear a pedometer and walk at least 10,000 steps/day        Dentist Screen:  Have you seen the dentist within the past year?: (!) No    Intervention:  Advised to schedule with their dentist        Advanced Directives:  Do you have a Living Will?: (!) No    Intervention:  has NO advanced directive - information provided                     Objective   Vitals:    10/09/24 1046   BP: (!) 153/93   Site: Left Upper Arm   Position: Sitting   Cuff Size: Large Adult   Pulse: 57   Temp: 97.6 °F (36.4 °C)   TempSrc: Temporal   SpO2: 98%   Weight: 118.4 kg (261 lb)   Height: 1.816 m (5' 11.5\")      Body mass index is 35.89 kg/m².

## 2024-10-09 NOTE — PATIENT INSTRUCTIONS
do to protect your heart. It is never too late to quit. Try to avoid secondhand smoke too.     Stay at a weight that's healthy for you. Talk to your doctor if you need help losing weight.     Try to get 7 to 9 hours of sleep each night.     Limit alcohol to 2 drinks a day for men and 1 drink a day for women. Too much alcohol can cause health problems.     Manage other health problems such as diabetes, high blood pressure, and high cholesterol. If you think you may have a problem with alcohol or drug use, talk to your doctor.   Medicines    Take your medicines exactly as prescribed. Call your doctor if you think you are having a problem with your medicine.     If your doctor recommends aspirin, take the amount directed each day. Make sure you take aspirin and not another kind of pain reliever, such as acetaminophen (Tylenol).   When should you call for help?   Call 911 if you have symptoms of a heart attack. These may include:    Chest pain or pressure, or a strange feeling in the chest.     Sweating.     Shortness of breath.     Pain, pressure, or a strange feeling in the back, neck, jaw, or upper belly or in one or both shoulders or arms.     Lightheadedness or sudden weakness.     A fast or irregular heartbeat.   After you call 911, the  may tell you to chew 1 adult-strength or 2 to 4 low-dose aspirin. Wait for an ambulance. Do not try to drive yourself.  Watch closely for changes in your health, and be sure to contact your doctor if you have any problems.  Where can you learn more?  Go to https://www.[employer].net/patientEd and enter F075 to learn more about \"A Healthy Heart: Care Instructions.\"  Current as of: June 24, 2023  Content Version: 14.2  © 2024 [employer].   Care instructions adapted under license by [employer]. If you have questions about a medical condition or this instruction, always ask your healthcare professional. [employer] disclaims any warranty or liability

## 2024-10-10 ENCOUNTER — HOSPITAL ENCOUNTER (OUTPATIENT)
Age: 69
Setting detail: OUTPATIENT SURGERY
Discharge: HOME | End: 2024-10-10
Attending: SURGERY | Admitting: SURGERY
Payer: MEDICARE

## 2024-10-10 ENCOUNTER — ANESTHESIA (OUTPATIENT)
Dept: SURGERY | Age: 69
End: 2024-10-10
Payer: MEDICARE

## 2024-10-10 ENCOUNTER — ANESTHESIA EVENT (OUTPATIENT)
Dept: SURGERY | Age: 69
End: 2024-10-10
Payer: MEDICARE

## 2024-10-10 VITALS
BODY MASS INDEX: 36.96 KG/M2 | OXYGEN SATURATION: 96 % | RESPIRATION RATE: 16 BRPM | SYSTOLIC BLOOD PRESSURE: 105 MMHG | DIASTOLIC BLOOD PRESSURE: 59 MMHG | TEMPERATURE: 97.8 F | HEIGHT: 71 IN | WEIGHT: 264 LBS | HEART RATE: 64 BPM

## 2024-10-10 DIAGNOSIS — R22.32 AXILLARY MASS, LEFT: Primary | ICD-10-CM

## 2024-10-10 LAB
GLUCOSE BLD STRIP.AUTO-MCNC: 155 MG/DL (ref 65–100)
SERVICE CMNT-IMP: ABNORMAL

## 2024-10-10 PROCEDURE — 2580000003 HC RX 258

## 2024-10-10 PROCEDURE — 82962 GLUCOSE BLOOD TEST: CPT

## 2024-10-10 PROCEDURE — 88305 TISSUE EXAM BY PATHOLOGIST: CPT

## 2024-10-10 PROCEDURE — 3600000012 HC SURGERY LEVEL 2 ADDTL 15MIN: Performed by: SURGERY

## 2024-10-10 PROCEDURE — 7100000011 HC PHASE II RECOVERY - ADDTL 15 MIN: Performed by: SURGERY

## 2024-10-10 PROCEDURE — 3700000000 HC ANESTHESIA ATTENDED CARE: Performed by: SURGERY

## 2024-10-10 PROCEDURE — 2500000003 HC RX 250 WO HCPCS

## 2024-10-10 PROCEDURE — 3600000002 HC SURGERY LEVEL 2 BASE: Performed by: SURGERY

## 2024-10-10 PROCEDURE — 6360000002 HC RX W HCPCS

## 2024-10-10 PROCEDURE — 2500000003 HC RX 250 WO HCPCS: Performed by: SURGERY

## 2024-10-10 PROCEDURE — 6360000002 HC RX W HCPCS: Performed by: SURGERY

## 2024-10-10 PROCEDURE — 3700000001 HC ADD 15 MINUTES (ANESTHESIA): Performed by: SURGERY

## 2024-10-10 PROCEDURE — 2709999900 HC NON-CHARGEABLE SUPPLY: Performed by: SURGERY

## 2024-10-10 PROCEDURE — 7100000010 HC PHASE II RECOVERY - FIRST 15 MIN: Performed by: SURGERY

## 2024-10-10 RX ORDER — SODIUM CHLORIDE 0.9 % (FLUSH) 0.9 %
5-40 SYRINGE (ML) INJECTION EVERY 12 HOURS SCHEDULED
Status: DISCONTINUED | OUTPATIENT
Start: 2024-10-10 | End: 2024-10-10 | Stop reason: HOSPADM

## 2024-10-10 RX ORDER — ONDANSETRON 2 MG/ML
INJECTION INTRAMUSCULAR; INTRAVENOUS
Status: DISCONTINUED | OUTPATIENT
Start: 2024-10-10 | End: 2024-10-10 | Stop reason: SDUPTHER

## 2024-10-10 RX ORDER — DEXAMETHASONE SODIUM PHOSPHATE 4 MG/ML
INJECTION, SOLUTION INTRA-ARTICULAR; INTRALESIONAL; INTRAMUSCULAR; INTRAVENOUS; SOFT TISSUE
Status: DISCONTINUED | OUTPATIENT
Start: 2024-10-10 | End: 2024-10-10 | Stop reason: SDUPTHER

## 2024-10-10 RX ORDER — BUPIVACAINE HYDROCHLORIDE AND EPINEPHRINE 5; 5 MG/ML; UG/ML
INJECTION, SOLUTION EPIDURAL; INTRACAUDAL; PERINEURAL PRN
Status: DISCONTINUED | OUTPATIENT
Start: 2024-10-10 | End: 2024-10-10 | Stop reason: ALTCHOICE

## 2024-10-10 RX ORDER — SODIUM CHLORIDE 9 MG/ML
INJECTION, SOLUTION INTRAVENOUS PRN
Status: DISCONTINUED | OUTPATIENT
Start: 2024-10-10 | End: 2024-10-10 | Stop reason: HOSPADM

## 2024-10-10 RX ORDER — LIDOCAINE HYDROCHLORIDE 10 MG/ML
1 INJECTION, SOLUTION INFILTRATION; PERINEURAL
Status: DISCONTINUED | OUTPATIENT
Start: 2024-10-10 | End: 2024-10-10 | Stop reason: HOSPADM

## 2024-10-10 RX ORDER — LIDOCAINE HYDROCHLORIDE 20 MG/ML
INJECTION, SOLUTION EPIDURAL; INFILTRATION; INTRACAUDAL; PERINEURAL
Status: DISCONTINUED | OUTPATIENT
Start: 2024-10-10 | End: 2024-10-10 | Stop reason: SDUPTHER

## 2024-10-10 RX ORDER — EPHEDRINE SULFATE 5 MG/ML
INJECTION INTRAVENOUS
Status: DISCONTINUED | OUTPATIENT
Start: 2024-10-10 | End: 2024-10-10 | Stop reason: SDUPTHER

## 2024-10-10 RX ORDER — SODIUM CHLORIDE 0.9 % (FLUSH) 0.9 %
5-40 SYRINGE (ML) INJECTION PRN
Status: DISCONTINUED | OUTPATIENT
Start: 2024-10-10 | End: 2024-10-10 | Stop reason: HOSPADM

## 2024-10-10 RX ORDER — PROPOFOL 10 MG/ML
INJECTION, EMULSION INTRAVENOUS
Status: DISCONTINUED | OUTPATIENT
Start: 2024-10-10 | End: 2024-10-10 | Stop reason: SDUPTHER

## 2024-10-10 RX ORDER — OXYCODONE HYDROCHLORIDE 5 MG/1
5 TABLET ORAL EVERY 6 HOURS PRN
Qty: 5 TABLET | Refills: 0 | Status: SHIPPED | OUTPATIENT
Start: 2024-10-10 | End: 2024-10-13

## 2024-10-10 RX ORDER — SODIUM CHLORIDE 0.9 % (FLUSH) 0.9 %
SYRINGE (ML) INJECTION
Status: DISCONTINUED | OUTPATIENT
Start: 2024-10-10 | End: 2024-10-10 | Stop reason: SDUPTHER

## 2024-10-10 RX ADMIN — Medication 2000 MG: at 09:40

## 2024-10-10 RX ADMIN — EPHEDRINE SULFATE 10 MG: 5 INJECTION INTRAVENOUS at 10:09

## 2024-10-10 RX ADMIN — LIDOCAINE HYDROCHLORIDE 100 MG: 20 INJECTION, SOLUTION EPIDURAL; INFILTRATION; INTRACAUDAL; PERINEURAL at 09:30

## 2024-10-10 RX ADMIN — PROPOFOL 50 MG: 10 INJECTION, EMULSION INTRAVENOUS at 09:36

## 2024-10-10 RX ADMIN — SODIUM CHLORIDE, PRESERVATIVE FREE 10 ML: 5 INJECTION INTRAVENOUS at 09:36

## 2024-10-10 RX ADMIN — SODIUM CHLORIDE, PRESERVATIVE FREE 10 ML: 5 INJECTION INTRAVENOUS at 09:30

## 2024-10-10 RX ADMIN — SODIUM CHLORIDE, PRESERVATIVE FREE 10 ML: 5 INJECTION INTRAVENOUS at 10:10

## 2024-10-10 RX ADMIN — SODIUM CHLORIDE, PRESERVATIVE FREE 10 ML: 5 INJECTION INTRAVENOUS at 09:51

## 2024-10-10 RX ADMIN — SODIUM CHLORIDE, PRESERVATIVE FREE 10 ML: 5 INJECTION INTRAVENOUS at 10:00

## 2024-10-10 RX ADMIN — DEXAMETHASONE SODIUM PHOSPHATE 4 MG: 4 INJECTION, SOLUTION INTRAMUSCULAR; INTRAVENOUS at 09:36

## 2024-10-10 RX ADMIN — EPHEDRINE SULFATE 10 MG: 5 INJECTION INTRAVENOUS at 09:51

## 2024-10-10 RX ADMIN — ONDANSETRON 4 MG: 2 INJECTION INTRAMUSCULAR; INTRAVENOUS at 09:36

## 2024-10-10 RX ADMIN — PHENYLEPHRINE HYDROCHLORIDE 100 MCG: 10 INJECTION INTRAVENOUS at 10:04

## 2024-10-10 RX ADMIN — PROPOFOL 200 MG: 10 INJECTION, EMULSION INTRAVENOUS at 09:30

## 2024-10-10 ASSESSMENT — PAIN - FUNCTIONAL ASSESSMENT
PAIN_FUNCTIONAL_ASSESSMENT: 0-10
PAIN_FUNCTIONAL_ASSESSMENT: NONE - DENIES PAIN

## 2024-10-10 NOTE — OP NOTE
[address]  [address]                            OPERATIVE REPORT      PATIENT NAME: LYNN SINGH           : 1955  MED REC NO: [id number]                       ROOM: [facility] NO: [id number]                       ADMIT DATE: 10/10/2024  PROVIDER: Karishma Tate MD    DATE OF SERVICE:  10/10/2024    PREOPERATIVE DIAGNOSES:  Left axillary mass concerning for metastatic melanoma.    POSTOPERATIVE DIAGNOSES:  Left axillary mass concerning for metastatic melanoma.    PROCEDURES PERFORMED:  Left axillary mass resection (superficial lymphadenectomy).    SURGEON:  Karishma Tate MD    ASSISTANT:  Ishmael    ANESTHESIA:  general    ESTIMATED BLOOD LOSS:  Minimal.    SPECIMENS REMOVED:  as above    INTRAOPERATIVE FINDINGS:  This appeared to be enlarged lymph nodes suspicious for metastatic disease.     COMPLICATIONS:  none    IMPLANTS:  none    INDICATIONS:  This is a 68-year-old gentleman with history of metastatic melanoma to his left axilla.  He had axillary dissection, radiation, and immunotherapy.  Admission followup shows enlarging mass in his left axilla that is concerning for recurrence.  I offered him excision for both diagnostic and therapeutic purposes.  He understood risks and benefits and agreed to proceed.    DESCRIPTION OF PROCEDURE:  After informed consent obtained, the patient was brought into the operating room.  General anesthesia was administered.  The patient's left arm was extended 90 degrees.  His armpit was adequately exposed and prepped and draped in routine fashion.  A curvilinear incision was made right over the palpable mass.  Dissection carried through the dermal layer and then we started dissecting around the mass and a good margin was obtained on the mass.  There were quite a bit of adhesions around it obviously from his previous surgery and radiation.  Mass was able to be completely dissected out and removed in one  piece and surgical field inspected.  Good hemostasis was obtained.  I tried to close the dermal layer, however, there was enough tissue to close and so the skin is closed with running 4-0 Vicryl stitch in subcuticular fashion.  The patient tolerated the procedure well and transferred to recovery room in stable condition.  All instrument count and lap count were correct.        KRYSTAL FRANKEL MD      BY/BIPIN  D:  10/10/2024 10:30:52  T:  10/10/2024 10:43:15  JOB #:  [id number]

## 2024-10-10 NOTE — BRIEF OP NOTE
Brief Postoperative Note      Patient: Yair Gee  YOB: 1955  MRN: [id number]    Date of Procedure: 10/10/2024    Pre-Op Diagnosis Codes:      * Axillary mass, left [R22.32]    Post-Op Diagnosis: Same       Procedure:  Left axillary mass resection (superficial lymphadenectomy)    Surgeon(s):  Karishma Tate MD    Assistant:  Physician Assistant: Leonidas Sanford PA-C    Anesthesia: General    Estimated Blood Loss (mL): Minimal    Complications: None    Specimens:   ID Type Source Tests Collected by Time Destination   A : Left Axillary Mass Tissue Axillary SURGICAL PATHOLOGY Karishma Tate MD 10/10/2024 1008        Implants:  * No implants in log *      Drains: * No LDAs found *    Findings:  Infection Present At Time Of Surgery (PATOS) (choose all levels that have infection present):  No infection present  Other Findings: it appears to be metastatic lymph node      Electronically signed by Karishma Tate MD on 10/10/2024 at 10:25 AM

## 2024-10-10 NOTE — ANESTHESIA POSTPROCEDURE EVALUATION
Department of Anesthesiology  Postprocedure Note    Patient: Yair Gee  MRN: [id number]  YOB: 1955  Date of evaluation: 10/10/2024    Procedure Summary       Date: 10/10/24 Room / Location: [facility] MAIN OR 02 / [facility] MAIN OR    Anesthesia Start: 0925 Anesthesia Stop: 1026    Procedure: AXILLARY LESION BIOPSY EXCISION LEFT (Left: Axilla) Diagnosis:       Axillary mass, left      (Axillary mass, left [R22.32])    Providers: Karishma Tate MD Responsible Provider: Joss Mark Jr., MD    Anesthesia Type: general ASA Status: 3            Anesthesia Type: No value filed.    Meagan Phase I: Meagan Score: 10    Meagan Phase II: Meagan Score: 10    Anesthesia Post Evaluation    Patient location during evaluation: PACU  Patient participation: complete - patient participated  Level of consciousness: awake  Pain score: 0  Airway patency: patent  Nausea & Vomiting: no nausea and no vomiting  Cardiovascular status: blood pressure returned to baseline and hemodynamically stable  Respiratory status: acceptable, spontaneous ventilation and nonlabored ventilation  Hydration status: euvolemic  Multimodal analgesia pain management approach  Pain management: adequate    No notable events documented.

## 2024-10-10 NOTE — DISCHARGE INSTRUCTIONS
Remove outer dressing and gauze after 24 hours, leave sterile strips on for 7-10 days, OK to shower    ACTIVITY  As tolerated and as directed by your doctor.   Bathe or shower as directed by your doctor.     DIET  Clear liquids until no nausea or vomiting; then light diet for the first day.  Advance to regular diet on second day, unless your doctor orders otherwise.   If nausea and vomiting continues, call your doctor.     PAIN  Take pain medication as directed by your doctor.   Call your doctor if pain is NOT relieved by medication.   DO NOT take aspirin of blood thinners unless directed by your doctor.     MEDICATION INTERACTION:During your procedure you potentially received a medication or medications which may reduce the effectiveness of oral contraceptives. Please consider other forms of contraception for 1 month following your procedure if you are currently using oral contraceptives as your primary form of birth control. In addition to this, we recommend continuing your oral contraceptive as prescribed, unless otherwise instructed by your physician, during this time      CALL YOUR DOCTOR IF   Excessive bleeding that does not stop after holding pressure over the area  Temperature of 101 degrees F or above  Excessive redness, swelling or bruising, and/ or green or yellow, smelly discharge from incision    After general anesthesia or intravenous sedation, for 24 hours or while taking prescription Narcotics:  Limit your activities  A responsible adult needs to be with you for the next 24 hours  Do not drive and operate hazardous machinery  Do not make important personal or business decisions  Do not drink alcoholic beverages  If you have not urinated within 8 hours after discharge, and you are experiencing discomfort from urinary retention, please go to the nearest ED.  If you have sleep apnea and have a CPAP machine, please use it for all naps and sleeping.  Please use caution when taking narcotics and any of

## 2024-10-10 NOTE — ANESTHESIA PRE PROCEDURE
malignancy/cancer (melanoma).                 Abdominal:             Vascular: negative vascular ROS.         Other Findings:       Anesthesia Plan      general     ASA 3       Induction: intravenous.    MIPS: Postoperative opioids intended and Prophylactic antiemetics administered.  Anesthetic plan and risks discussed with patient.                    SAVAGE WILLIS JR, MD   10/10/2024

## 2024-10-22 ENCOUNTER — OFFICE VISIT (OUTPATIENT)
Dept: SURGERY | Age: 69
End: 2024-10-22

## 2024-10-22 VITALS
BODY MASS INDEX: 36.96 KG/M2 | SYSTOLIC BLOOD PRESSURE: 144 MMHG | HEIGHT: 71 IN | OXYGEN SATURATION: 98 % | WEIGHT: 264 LBS | DIASTOLIC BLOOD PRESSURE: 100 MMHG | HEART RATE: 86 BPM

## 2024-10-22 DIAGNOSIS — Z48.89 AFTERCARE FOLLOWING SURGERY: Primary | ICD-10-CM

## 2024-10-22 PROCEDURE — 99024 POSTOP FOLLOW-UP VISIT: CPT | Performed by: SURGERY

## 2024-10-22 NOTE — PROGRESS NOTES
[facility]  [address] [place] [address], [address]  [place] [id number]  [phone]      SUBJECTIVE: Yair Gee is a 69 y.o. male is seen for a routine postop check. He had a left axillary lymph node removed. Today, he reports no problems with the wound or other issues.  Activity, diet and bowels are normal. No pain.    OBJECTIVE: Appears well. Wound is well healed without complications or infection.    PATH:\"LEFT AXILLARY MASS\":  METASTATIC MELANOMA INVOLVING A LYMPH NODE, MARGINS APPEAR CLOSE BUT UNINVOLVED.     ASSESSMENT: normal postoperative course, doing well.    PLAN: Follow with oncology. Return PRN.    Karishma Tate MD

## 2024-10-29 ENCOUNTER — OFFICE VISIT (OUTPATIENT)
Dept: ONCOLOGY | Age: 69
End: 2024-10-29
Payer: MEDICARE

## 2024-10-29 ENCOUNTER — HOSPITAL ENCOUNTER (OUTPATIENT)
Dept: LAB | Age: 69
Discharge: HOME | End: 2024-10-29
Payer: MEDICARE

## 2024-10-29 VITALS
HEIGHT: 72 IN | DIASTOLIC BLOOD PRESSURE: 90 MMHG | TEMPERATURE: 97.4 F | OXYGEN SATURATION: 98 % | BODY MASS INDEX: 35.69 KG/M2 | HEART RATE: 72 BPM | RESPIRATION RATE: 18 BRPM | WEIGHT: 263.5 LBS | SYSTOLIC BLOOD PRESSURE: 163 MMHG

## 2024-10-29 DIAGNOSIS — C43.9 METASTATIC MELANOMA (HCC): ICD-10-CM

## 2024-10-29 DIAGNOSIS — C43.62 MELANOMA OF LEFT UPPER ARM (HCC): ICD-10-CM

## 2024-10-29 DIAGNOSIS — C43.62 MELANOMA OF LEFT UPPER ARM (HCC): Primary | ICD-10-CM

## 2024-10-29 LAB
ALBUMIN SERPL-MCNC: 3.9 G/DL (ref 3.2–4.6)
ALBUMIN/GLOB SERPL: 1.2 (ref 1–1.9)
ALP SERPL-CCNC: 74 U/L (ref 40–129)
ALT SERPL-CCNC: 24 U/L (ref 8–55)
ANION GAP SERPL CALC-SCNC: 10 MMOL/L (ref 7–16)
AST SERPL-CCNC: 31 U/L (ref 15–37)
BASOPHILS # BLD: 0.1 K/UL (ref 0–0.2)
BASOPHILS NFR BLD: 1 % (ref 0–2)
BILIRUB SERPL-MCNC: 0.5 MG/DL (ref 0–1.2)
BUN SERPL-MCNC: 17 MG/DL (ref 8–23)
CALCIUM SERPL-MCNC: 9.4 MG/DL (ref 8.8–10.2)
CHLORIDE SERPL-SCNC: 103 MMOL/L (ref 98–107)
CO2 SERPL-SCNC: 26 MMOL/L (ref 20–29)
CREAT SERPL-MCNC: 0.91 MG/DL (ref 0.8–1.3)
DIFFERENTIAL METHOD BLD: ABNORMAL
EOSINOPHIL # BLD: 0.6 K/UL (ref 0–0.8)
EOSINOPHIL NFR BLD: 9 % (ref 0.5–7.8)
ERYTHROCYTE [DISTWIDTH] IN BLOOD BY AUTOMATED COUNT: 13.2 % (ref 11.9–14.6)
GLOBULIN SER CALC-MCNC: 3.3 G/DL (ref 2.3–3.5)
GLUCOSE SERPL-MCNC: 165 MG/DL (ref 70–99)
HCT VFR BLD AUTO: 40 % (ref 41.1–50.3)
HGB BLD-MCNC: 13.3 G/DL (ref 13.6–17.2)
IMM GRANULOCYTES # BLD AUTO: 0 K/UL (ref 0–0.5)
IMM GRANULOCYTES NFR BLD AUTO: 0 % (ref 0–5)
LYMPHOCYTES # BLD: 1.1 K/UL (ref 0.5–4.6)
LYMPHOCYTES NFR BLD: 18 % (ref 13–44)
MCH RBC QN AUTO: 33 PG (ref 26.1–32.9)
MCHC RBC AUTO-ENTMCNC: 33.3 G/DL (ref 31.4–35)
MCV RBC AUTO: 99.3 FL (ref 82–102)
MONOCYTES # BLD: 0.9 K/UL (ref 0.1–1.3)
MONOCYTES NFR BLD: 14 % (ref 4–12)
NEUTS SEG # BLD: 3.7 K/UL (ref 1.7–8.2)
NEUTS SEG NFR BLD: 58 % (ref 43–78)
NRBC # BLD: 0 K/UL (ref 0–0.2)
PLATELET # BLD AUTO: 221 K/UL (ref 150–450)
PMV BLD AUTO: 10.2 FL (ref 9.4–12.3)
POTASSIUM SERPL-SCNC: 5 MMOL/L (ref 3.5–5.1)
PROT SERPL-MCNC: 7.2 G/DL (ref 6.3–8.2)
RBC # BLD AUTO: 4.03 M/UL (ref 4.23–5.6)
SODIUM SERPL-SCNC: 139 MMOL/L (ref 136–145)
WBC # BLD AUTO: 6.4 K/UL (ref 4.3–11.1)

## 2024-10-29 PROCEDURE — 3080F DIAST BP >= 90 MM HG: CPT | Performed by: INTERNAL MEDICINE

## 2024-10-29 PROCEDURE — G8427 DOCREV CUR MEDS BY ELIG CLIN: HCPCS | Performed by: INTERNAL MEDICINE

## 2024-10-29 PROCEDURE — 1036F TOBACCO NON-USER: CPT | Performed by: INTERNAL MEDICINE

## 2024-10-29 PROCEDURE — 36415 COLL VENOUS BLD VENIPUNCTURE: CPT

## 2024-10-29 PROCEDURE — 99214 OFFICE O/P EST MOD 30 MIN: CPT | Performed by: INTERNAL MEDICINE

## 2024-10-29 PROCEDURE — 3017F COLORECTAL CA SCREEN DOC REV: CPT | Performed by: INTERNAL MEDICINE

## 2024-10-29 PROCEDURE — G8417 CALC BMI ABV UP PARAM F/U: HCPCS | Performed by: INTERNAL MEDICINE

## 2024-10-29 PROCEDURE — 3077F SYST BP >= 140 MM HG: CPT | Performed by: INTERNAL MEDICINE

## 2024-10-29 PROCEDURE — 80053 COMPREHEN METABOLIC PANEL: CPT

## 2024-10-29 PROCEDURE — 1160F RVW MEDS BY RX/DR IN RCRD: CPT | Performed by: INTERNAL MEDICINE

## 2024-10-29 PROCEDURE — 1123F ACP DISCUSS/DSCN MKR DOCD: CPT | Performed by: INTERNAL MEDICINE

## 2024-10-29 PROCEDURE — 1126F AMNT PAIN NOTED NONE PRSNT: CPT | Performed by: INTERNAL MEDICINE

## 2024-10-29 PROCEDURE — G8484 FLU IMMUNIZE NO ADMIN: HCPCS | Performed by: INTERNAL MEDICINE

## 2024-10-29 PROCEDURE — 1159F MED LIST DOCD IN RCRD: CPT | Performed by: INTERNAL MEDICINE

## 2024-10-29 PROCEDURE — 85025 COMPLETE CBC W/AUTO DIFF WBC: CPT

## 2024-10-29 ASSESSMENT — PATIENT HEALTH QUESTIONNAIRE - PHQ9
1. LITTLE INTEREST OR PLEASURE IN DOING THINGS: NOT AT ALL
SUM OF ALL RESPONSES TO PHQ QUESTIONS 1-9: 0
2. FEELING DOWN, DEPRESSED OR HOPELESS: NOT AT ALL
SUM OF ALL RESPONSES TO PHQ9 QUESTIONS 1 & 2: 0

## 2024-10-29 NOTE — PATIENT INSTRUCTIONS
Patient Information from Today's Visit    The members of your Oncology Medical Home are listed below:    Physician Provider: Marino Bone Medical Oncologist  Advanced Practice Clinician: Cecile Man NP  Registered Nurse: Aria GILMORE   Navigator: N/A  Medical Assistant: Myrtle NEVES MA  : Awa LEI   Supportive Care Services: Sylvia NAIK LMSW    Diagnosis: Melanoma      Follow Up Instructions: After PET scan and after you see radiation oncologist.    Labs reviewed.  Symptoms reviewed.  Referral to radiation oncologist.  PET scan now.  You can call to schedule this at [phone].      Treatment Summary has been discussed and given to patient:N/A      Current Labs:   Hospital Outpatient Visit on 10/29/2024   Component Date Value Ref Range Status    WBC 10/29/2024 6.4  4.3 - 11.1 K/uL Final    RBC 10/29/2024 4.03 (L)  4.23 - 5.6 M/uL Final    Hemoglobin 10/29/2024 13.3 (L)  13.6 - 17.2 g/dL Final    Hematocrit 10/29/2024 40.0 (L)  41.1 - 50.3 % Final    MCV 10/29/2024 99.3  82.0 - 102.0 FL Final    MCH 10/29/2024 33.0 (H)  26.1 - 32.9 PG Final    MCHC 10/29/2024 33.3  31.4 - 35.0 g/dL Final    RDW 10/29/2024 13.2  11.9 - 14.6 % Final    Platelets 10/29/2024 221  150 - 450 K/uL Final    MPV 10/29/2024 10.2  9.4 - 12.3 FL Final    nRBC 10/29/2024 0.00  0.0 - 0.2 K/uL Final    **Note: Absolute NRBC parameter is now reported with Hemogram**    Neutrophils % 10/29/2024 58  43 - 78 % Final    Lymphocytes % 10/29/2024 18  13 - 44 % Final    Monocytes % 10/29/2024 14 (H)  4.0 - 12.0 % Final    Eosinophils % 10/29/2024 9 (H)  0.5 - 7.8 % Final    Basophils % 10/29/2024 1  0.0 - 2.0 % Final    Immature Granulocytes % 10/29/2024 0  0.0 - 5.0 % Final    Neutrophils Absolute 10/29/2024 3.7  1.7 - 8.2 K/UL Final    Lymphocytes Absolute 10/29/2024 1.1  0.5 - 4.6 K/UL Final    Monocytes Absolute 10/29/2024 0.9  0.1 - 1.3 K/UL Final    Eosinophils Absolute 10/29/2024 0.6  0.0 - 0.8 K/UL Final    Basophils Absolute

## 2024-10-29 NOTE — PROGRESS NOTES
not affecting QOL     Fatigue -   Distress -        No data to display                    Total time independently spent on today's visit was 30min. This time included: face-to-face time evaluating the patient as well as additional non-face-to-face time spent on: Preparing to see the patient by obtaining and reviewing previous test results, records and medical history, Performing a medically appropriate history and exam and documenting relevant clinical information for this visit, Counseling and educating patient and family, Ordering tests, Communicating with other health care professionals and Referring patient to another health care provider.    Elements of this note have been dictated via voice recognition software.  Text and phrases may be limited by the accuracy and autoconversion of the software.  The chart has been reviewed, but errors may still be present.          [place] [address]  Office : [phone]  Fax : [phone]

## 2024-11-12 ENCOUNTER — HOSPITAL ENCOUNTER (OUTPATIENT)
Dept: PET IMAGING | Age: 69
Discharge: HOME | End: 2024-11-15
Attending: INTERNAL MEDICINE
Payer: MEDICARE

## 2024-11-12 DIAGNOSIS — C43.62 MELANOMA OF LEFT UPPER ARM (HCC): ICD-10-CM

## 2024-11-12 DIAGNOSIS — C43.9 METASTATIC MELANOMA (HCC): ICD-10-CM

## 2024-11-12 LAB
GLUCOSE BLD STRIP.AUTO-MCNC: 163 MG/DL (ref 65–100)
SERVICE CMNT-IMP: ABNORMAL

## 2024-11-12 PROCEDURE — 3430000000 HC RX DIAGNOSTIC RADIOPHARMACEUTICAL: Performed by: INTERNAL MEDICINE

## 2024-11-12 PROCEDURE — 2580000003 HC RX 258: Performed by: INTERNAL MEDICINE

## 2024-11-12 PROCEDURE — A9609 HC RX DIAGNOSTIC RADIOPHARMACEUTICAL: HCPCS | Performed by: INTERNAL MEDICINE

## 2024-11-12 PROCEDURE — 6360000004 HC RX CONTRAST MEDICATION: Performed by: INTERNAL MEDICINE

## 2024-11-12 PROCEDURE — 82962 GLUCOSE BLOOD TEST: CPT

## 2024-11-12 PROCEDURE — 78816 PET IMAGE W/CT FULL BODY: CPT

## 2024-11-12 RX ORDER — SODIUM CHLORIDE 0.9 % (FLUSH) 0.9 %
10 SYRINGE (ML) INJECTION ONCE AS NEEDED
Status: COMPLETED | OUTPATIENT
Start: 2024-11-12 | End: 2024-11-12

## 2024-11-12 RX ORDER — FLUDEOXYGLUCOSE F 18 200 MCI/ML
11.91 INJECTION, SOLUTION INTRAVENOUS
Status: COMPLETED | OUTPATIENT
Start: 2024-11-12 | End: 2024-11-12

## 2024-11-12 RX ORDER — DIATRIZOATE MEGLUMINE AND DIATRIZOATE SODIUM 660; 100 MG/ML; MG/ML
10 SOLUTION ORAL; RECTAL
Status: DISCONTINUED | OUTPATIENT
Start: 2024-11-12 | End: 2024-11-16 | Stop reason: HOSPADM

## 2024-11-12 RX ADMIN — SODIUM CHLORIDE, PRESERVATIVE FREE 10 ML: 5 INJECTION INTRAVENOUS at 10:52

## 2024-11-12 RX ADMIN — DIATRIZOATE MEGLUMINE AND DIATRIZOATE SODIUM 10 ML: 660; 100 LIQUID ORAL; RECTAL at 10:52

## 2024-11-12 RX ADMIN — FLUDEOXYGLUCOSE F 18 11.91 MILLICURIE: 200 INJECTION, SOLUTION INTRAVENOUS at 10:52

## 2024-11-19 ENCOUNTER — HOSPITAL ENCOUNTER (OUTPATIENT)
Dept: RADIATION ONCOLOGY | Age: 69
Setting detail: RECURRING SERIES
Discharge: HOME | End: 2024-11-22
Payer: MEDICARE

## 2024-11-19 VITALS
DIASTOLIC BLOOD PRESSURE: 82 MMHG | BODY MASS INDEX: 36.66 KG/M2 | TEMPERATURE: 98.4 F | WEIGHT: 266.6 LBS | OXYGEN SATURATION: 95 % | SYSTOLIC BLOOD PRESSURE: 148 MMHG | HEART RATE: 62 BPM

## 2024-11-19 PROCEDURE — 99211 OFF/OP EST MAY X REQ PHY/QHP: CPT

## 2024-11-19 ASSESSMENT — PATIENT HEALTH QUESTIONNAIRE - PHQ9
SUM OF ALL RESPONSES TO PHQ QUESTIONS 1-9: 0
SUM OF ALL RESPONSES TO PHQ QUESTIONS 1-9: 0
SUM OF ALL RESPONSES TO PHQ9 QUESTIONS 1 & 2: 0
SUM OF ALL RESPONSES TO PHQ QUESTIONS 1-9: 0
SUM OF ALL RESPONSES TO PHQ QUESTIONS 1-9: 0
2. FEELING DOWN, DEPRESSED OR HOPELESS: NOT AT ALL
1. LITTLE INTEREST OR PLEASURE IN DOING THINGS: NOT AT ALL

## 2024-11-19 NOTE — PROGRESS NOTES
Consult Melanoma.  Pt alone for consult.  Pathology 10-10-24.   Whole body pet scan 11-12-24.  Follow up with Dr. Bone 12-2-24.   Pt has history of previous Radiation.  Follow and CT Sim scheduled Monday.  NO CONSENTS WERE SIGNED TODAY.    Rose Erickson RN      
Zach Gee is a 69 y.o. male with recurrent melanoma s/p resection and adjuvant radiation presenting for discussion of re irradiation.      I had a long discussion with Yair Gee regarding treatment options including continued observation versus radiation. I discussed that radiation may be beneficial in this case given recurrent disease and close margins. I reviewed his prior radiation plan and location of his most recent recurrence on CT scan 4/30/24. The area of most recent recurrence feels appears more superficial compared to his previous plans and may be treated with electron therapy to spare his brachial plexus. I will discuss the area of recurrence with Dr. Tate to confirm. I reviewed in detail the logistics as well as the possible acute and late toxicities of radiation therapy. I recommended treatment to 48Gy in 20 fractions once daily. He states that his prior radiation was given 1-2 times per week, I will request documentation regarding his prior treatment delivery. The intent of care is curative. Yair Gee had the opportunity to ask questions and will follow up in 1 week to discuss treatment recommendations and for tentative radiation planning scan.     PLAN:    Follow up in 1 week to review treatment options and for treatment planning scan  2)   Pain plan: Pain is not present.    I spent a total of 60 minutes today performing the following care related activities for Yair Gee: Face to face exam/evaluation, Obtain/Review external records, /Educate Patient/Caregivers, Pre-Charting/Documenting after the visit, Interpreting/Ordering Meds, Tests, Procedures, and Discuss/Coordinate condition with providers    Rubi Sherman MD   November 18, 2024

## 2024-11-20 NOTE — PROGRESS NOTES
[facility] RADIATION ONCOLOGY CONSULTATION    Patient: Yair Gee MRN: [id number]  SSN: [id number]    YOB: 1955  Age: 69 y.o.  Sex: male      Other Providers:  Marino Bone MD     CHIEF COMPLAINT: Recurrent melanoma    DIAGNOSIS: Recurrent melanoma    PREVIOUS RADIATION TREATMENT:  None    HISTORY OF PRESENT ILLNESS:  Yair Gee is a 69 y.o. male who I am seeing at the request of Marino Bone MD.    Mr. Gee has a history of left arm melanoma with positive axillary sentinel lymph node, T1N1, s/p resection in 2018 followed by 1 year of adjuvant nivolumab. In 2020 he experienced a recurrence treated with re excision followed by radiation and adjuvant dabrafenib/ trametinib. Repeat CT 8/22/24 showed a left axillary nodule. He underwent excision 10/10/24. Final pathology showed recurrent melanoma, margins close but not involved. PET/CT shows no evidence of tumor recurrence or metastatic disease. He denies pain and has healed well from surgery.    INTERVAL HISTORY:  Since his last visit in radiation oncology imaging was discussed with Dr. Tate who confirms a superficial recurrence. Prior XRT records were reviewed confirming treatment with 48Gy once daily for 20 fractions 10/29/2020-11/25/2020. He denies new symptoms.     PAST MEDICAL HISTORY:    Past Medical History:   Diagnosis Date    Hypertension     managed with med    Melanoma of left upper arm (HCC)     Type 2 diabetes mellitus without complication (HCC)     oral reliant; does not check glucose; denies hypo sx; Last A1C 7.2 on 07/09/2024     PAST SURGICAL HISTORY:   Past Surgical History:   Procedure Laterality Date    AXILLARY SURGERY Left 10/10/2024    AXILLARY LESION BIOPSY EXCISION LEFT performed by Karishma Tate MD at [facility] MAIN OR    COLONOSCOPY  2022    PANCREAS BIOPSY      ROTATOR CUFF REPAIR Left     SKIN CANCER EXCISION Left     melanoma removed from axillary    UMBILICAL HERNIA

## 2024-11-25 ENCOUNTER — HOSPITAL ENCOUNTER (OUTPATIENT)
Dept: RADIATION ONCOLOGY | Age: 69
Setting detail: RECURRING SERIES
Discharge: HOME | End: 2024-11-28
Payer: MEDICARE

## 2024-11-25 VITALS
DIASTOLIC BLOOD PRESSURE: 77 MMHG | OXYGEN SATURATION: 98 % | SYSTOLIC BLOOD PRESSURE: 161 MMHG | HEART RATE: 61 BPM | TEMPERATURE: 97.4 F

## 2024-11-25 PROCEDURE — 77290 THER RAD SIMULAJ FIELD CPLX: CPT

## 2024-11-25 NOTE — PROGRESS NOTES
Follow up Recurrent Melanoma left axilla.  Pt denies pain.  No changes in allergies/meds.  CONSENTS SIGNED FOR RT.  CT SIM TODAY.    Rose Erickson RN

## 2024-11-27 DIAGNOSIS — C43.62 MELANOMA OF LEFT UPPER ARM (HCC): Primary | ICD-10-CM

## 2024-12-03 ENCOUNTER — HOSPITAL ENCOUNTER (OUTPATIENT)
Dept: RADIATION ONCOLOGY | Age: 69
Setting detail: RECURRING SERIES
Discharge: HOME | End: 2024-12-06
Payer: MEDICARE

## 2024-12-03 LAB
RAD ONC ARIA COURSE FIRST TREATMENT DATE: NORMAL
RAD ONC ARIA COURSE ID: NORMAL
RAD ONC ARIA COURSE LAST TREATMENT DATE: NORMAL
RAD ONC ARIA COURSE SESSION NUMBER: 1
RAD ONC ARIA COURSE START DATE: NORMAL
RAD ONC ARIA COURSE TREATMENT ELAPSED DAYS: 0
RAD ONC ARIA PLAN FRACTIONS TREATED TO DATE: 1
RAD ONC ARIA PLAN ID: NORMAL
RAD ONC ARIA PLAN PRESCRIBED DOSE PER FRACTION: 2.4 GY
RAD ONC ARIA PLAN PRIMARY REFERENCE POINT: NORMAL
RAD ONC ARIA PLAN TOTAL FRACTIONS PRESCRIBED: 20
RAD ONC ARIA PLAN TOTAL PRESCRIBED DOSE: 4800 CGY
RAD ONC ARIA REFERENCE POINT DOSAGE GIVEN TO DATE: 2.4 GY
RAD ONC ARIA REFERENCE POINT ID: NORMAL
RAD ONC ARIA REFERENCE POINT SESSION DOSAGE GIVEN: 2.4 GY

## 2024-12-03 PROCEDURE — 77412 RADIATION TX DELIVERY LVL 3: CPT

## 2024-12-03 PROCEDURE — 77280 THER RAD SIMULAJ FIELD SMPL: CPT

## 2024-12-04 ENCOUNTER — HOSPITAL ENCOUNTER (OUTPATIENT)
Dept: RADIATION ONCOLOGY | Age: 69
Setting detail: RECURRING SERIES
Discharge: HOME | End: 2024-12-07
Payer: MEDICARE

## 2024-12-04 LAB
RAD ONC ARIA COURSE FIRST TREATMENT DATE: NORMAL
RAD ONC ARIA COURSE ID: NORMAL
RAD ONC ARIA COURSE LAST TREATMENT DATE: NORMAL
RAD ONC ARIA COURSE SESSION NUMBER: 2
RAD ONC ARIA COURSE START DATE: NORMAL
RAD ONC ARIA COURSE TREATMENT ELAPSED DAYS: 1
RAD ONC ARIA PLAN FRACTIONS TREATED TO DATE: 2
RAD ONC ARIA PLAN ID: NORMAL
RAD ONC ARIA PLAN PRESCRIBED DOSE PER FRACTION: 2.4 GY
RAD ONC ARIA PLAN PRIMARY REFERENCE POINT: NORMAL
RAD ONC ARIA PLAN TOTAL FRACTIONS PRESCRIBED: 20
RAD ONC ARIA PLAN TOTAL PRESCRIBED DOSE: 4800 CGY
RAD ONC ARIA REFERENCE POINT DOSAGE GIVEN TO DATE: 4.8 GY
RAD ONC ARIA REFERENCE POINT ID: NORMAL
RAD ONC ARIA REFERENCE POINT SESSION DOSAGE GIVEN: 2.4 GY

## 2024-12-04 PROCEDURE — 77412 RADIATION TX DELIVERY LVL 3: CPT

## 2024-12-05 ENCOUNTER — HOSPITAL ENCOUNTER (OUTPATIENT)
Dept: RADIATION ONCOLOGY | Age: 69
Setting detail: RECURRING SERIES
Discharge: HOME | End: 2024-12-08
Payer: MEDICARE

## 2024-12-05 LAB
RAD ONC ARIA COURSE FIRST TREATMENT DATE: NORMAL
RAD ONC ARIA COURSE ID: NORMAL
RAD ONC ARIA COURSE LAST TREATMENT DATE: NORMAL
RAD ONC ARIA COURSE SESSION NUMBER: 3
RAD ONC ARIA COURSE START DATE: NORMAL
RAD ONC ARIA COURSE TREATMENT ELAPSED DAYS: 2
RAD ONC ARIA PLAN FRACTIONS TREATED TO DATE: 3
RAD ONC ARIA PLAN ID: NORMAL
RAD ONC ARIA PLAN PRESCRIBED DOSE PER FRACTION: 2.4 GY
RAD ONC ARIA PLAN PRIMARY REFERENCE POINT: NORMAL
RAD ONC ARIA PLAN TOTAL FRACTIONS PRESCRIBED: 20
RAD ONC ARIA PLAN TOTAL PRESCRIBED DOSE: 4800 CGY
RAD ONC ARIA REFERENCE POINT DOSAGE GIVEN TO DATE: 7.2 GY
RAD ONC ARIA REFERENCE POINT ID: NORMAL
RAD ONC ARIA REFERENCE POINT SESSION DOSAGE GIVEN: 2.4 GY

## 2024-12-05 PROCEDURE — 77412 RADIATION TX DELIVERY LVL 3: CPT

## 2024-12-06 ENCOUNTER — HOSPITAL ENCOUNTER (OUTPATIENT)
Dept: RADIATION ONCOLOGY | Age: 69
Setting detail: RECURRING SERIES
Discharge: HOME | End: 2024-12-09
Payer: MEDICARE

## 2024-12-06 LAB
RAD ONC ARIA COURSE FIRST TREATMENT DATE: NORMAL
RAD ONC ARIA COURSE ID: NORMAL
RAD ONC ARIA COURSE LAST TREATMENT DATE: NORMAL
RAD ONC ARIA COURSE SESSION NUMBER: 4
RAD ONC ARIA COURSE START DATE: NORMAL
RAD ONC ARIA COURSE TREATMENT ELAPSED DAYS: 3
RAD ONC ARIA PLAN FRACTIONS TREATED TO DATE: 4
RAD ONC ARIA PLAN ID: NORMAL
RAD ONC ARIA PLAN PRESCRIBED DOSE PER FRACTION: 2.4 GY
RAD ONC ARIA PLAN PRIMARY REFERENCE POINT: NORMAL
RAD ONC ARIA PLAN TOTAL FRACTIONS PRESCRIBED: 20
RAD ONC ARIA PLAN TOTAL PRESCRIBED DOSE: 4800 CGY
RAD ONC ARIA REFERENCE POINT DOSAGE GIVEN TO DATE: 9.6 GY
RAD ONC ARIA REFERENCE POINT ID: NORMAL
RAD ONC ARIA REFERENCE POINT SESSION DOSAGE GIVEN: 2.4 GY

## 2024-12-06 PROCEDURE — 77412 RADIATION TX DELIVERY LVL 3: CPT

## 2024-12-09 ENCOUNTER — HOSPITAL ENCOUNTER (OUTPATIENT)
Dept: RADIATION ONCOLOGY | Age: 69
Setting detail: RECURRING SERIES
Discharge: HOME | End: 2024-12-12
Payer: MEDICARE

## 2024-12-09 LAB
RAD ONC ARIA COURSE FIRST TREATMENT DATE: NORMAL
RAD ONC ARIA COURSE ID: NORMAL
RAD ONC ARIA COURSE LAST TREATMENT DATE: NORMAL
RAD ONC ARIA COURSE SESSION NUMBER: 5
RAD ONC ARIA COURSE START DATE: NORMAL
RAD ONC ARIA COURSE TREATMENT ELAPSED DAYS: 6
RAD ONC ARIA PLAN FRACTIONS TREATED TO DATE: 5
RAD ONC ARIA PLAN ID: NORMAL
RAD ONC ARIA PLAN PRESCRIBED DOSE PER FRACTION: 2.4 GY
RAD ONC ARIA PLAN PRIMARY REFERENCE POINT: NORMAL
RAD ONC ARIA PLAN TOTAL FRACTIONS PRESCRIBED: 20
RAD ONC ARIA PLAN TOTAL PRESCRIBED DOSE: 4800 CGY
RAD ONC ARIA REFERENCE POINT DOSAGE GIVEN TO DATE: 12 GY
RAD ONC ARIA REFERENCE POINT ID: NORMAL
RAD ONC ARIA REFERENCE POINT SESSION DOSAGE GIVEN: 2.4 GY

## 2024-12-09 PROCEDURE — 77412 RADIATION TX DELIVERY LVL 3: CPT

## 2024-12-09 PROCEDURE — 77336 RADIATION PHYSICS CONSULT: CPT

## 2024-12-09 NOTE — ON TREATMENT VISIT
[facility] RADIATION ONCOLOGY ON TREATMENT VISIT    Patient: Yair Gee MRN: [id number]  SSN: [id number]    YOB: 1955  Age: 69 y.o.  Sex: male      12/09/24    Diagnosis:  Recurrent melanoma    This is a 69 y.o. male who is currently receiving adjuvant radiation therapy.    Current RT dose: 12/48 Gy in 5/20 fractions.     No concurrent systemic therapy    Subjective:  Week 1: No complaints.     Objective:  There were no vitals filed for this visit.  Pain 0/10    General: Alert and conversant, in NAD  Skin: Intact.     Assessment:  Patient is tolerating radiation therapy well without treatment related toxicities at this time.     Plan:  -Moisturizer at night.  -Continue RT as planned.  -Treatment images reviewed.  -The patient has a documented plan of care to address pain.  Pain is not present.    Rubi Sherman MD  12/09/24

## 2024-12-10 ENCOUNTER — HOSPITAL ENCOUNTER (OUTPATIENT)
Dept: RADIATION ONCOLOGY | Age: 69
Setting detail: RECURRING SERIES
Discharge: HOME | End: 2024-12-13
Payer: MEDICARE

## 2024-12-10 LAB
RAD ONC ARIA COURSE FIRST TREATMENT DATE: NORMAL
RAD ONC ARIA COURSE ID: NORMAL
RAD ONC ARIA COURSE LAST TREATMENT DATE: NORMAL
RAD ONC ARIA COURSE SESSION NUMBER: 6
RAD ONC ARIA COURSE START DATE: NORMAL
RAD ONC ARIA COURSE TREATMENT ELAPSED DAYS: 7
RAD ONC ARIA PLAN FRACTIONS TREATED TO DATE: 6
RAD ONC ARIA PLAN ID: NORMAL
RAD ONC ARIA PLAN PRESCRIBED DOSE PER FRACTION: 2.4 GY
RAD ONC ARIA PLAN PRIMARY REFERENCE POINT: NORMAL
RAD ONC ARIA PLAN TOTAL FRACTIONS PRESCRIBED: 20
RAD ONC ARIA PLAN TOTAL PRESCRIBED DOSE: 4800 CGY
RAD ONC ARIA REFERENCE POINT DOSAGE GIVEN TO DATE: 14.4 GY
RAD ONC ARIA REFERENCE POINT ID: NORMAL
RAD ONC ARIA REFERENCE POINT SESSION DOSAGE GIVEN: 2.4 GY

## 2024-12-10 PROCEDURE — 77412 RADIATION TX DELIVERY LVL 3: CPT

## 2024-12-11 ENCOUNTER — HOSPITAL ENCOUNTER (OUTPATIENT)
Dept: RADIATION ONCOLOGY | Age: 69
Setting detail: RECURRING SERIES
Discharge: HOME | End: 2024-12-14
Payer: MEDICARE

## 2024-12-11 LAB
RAD ONC ARIA COURSE FIRST TREATMENT DATE: NORMAL
RAD ONC ARIA COURSE ID: NORMAL
RAD ONC ARIA COURSE LAST TREATMENT DATE: NORMAL
RAD ONC ARIA COURSE SESSION NUMBER: 7
RAD ONC ARIA COURSE START DATE: NORMAL
RAD ONC ARIA COURSE TREATMENT ELAPSED DAYS: 8
RAD ONC ARIA PLAN FRACTIONS TREATED TO DATE: 7
RAD ONC ARIA PLAN ID: NORMAL
RAD ONC ARIA PLAN PRESCRIBED DOSE PER FRACTION: 2.4 GY
RAD ONC ARIA PLAN PRIMARY REFERENCE POINT: NORMAL
RAD ONC ARIA PLAN TOTAL FRACTIONS PRESCRIBED: 20
RAD ONC ARIA PLAN TOTAL PRESCRIBED DOSE: 4800 CGY
RAD ONC ARIA REFERENCE POINT DOSAGE GIVEN TO DATE: 16.8 GY
RAD ONC ARIA REFERENCE POINT ID: NORMAL
RAD ONC ARIA REFERENCE POINT SESSION DOSAGE GIVEN: 2.4 GY

## 2024-12-11 PROCEDURE — 77412 RADIATION TX DELIVERY LVL 3: CPT

## 2024-12-12 ENCOUNTER — HOSPITAL ENCOUNTER (OUTPATIENT)
Dept: RADIATION ONCOLOGY | Age: 69
Setting detail: RECURRING SERIES
Discharge: HOME | End: 2024-12-15
Payer: MEDICARE

## 2024-12-12 LAB
RAD ONC ARIA COURSE FIRST TREATMENT DATE: NORMAL
RAD ONC ARIA COURSE ID: NORMAL
RAD ONC ARIA COURSE LAST TREATMENT DATE: NORMAL
RAD ONC ARIA COURSE SESSION NUMBER: 8
RAD ONC ARIA COURSE START DATE: NORMAL
RAD ONC ARIA COURSE TREATMENT ELAPSED DAYS: 9
RAD ONC ARIA PLAN FRACTIONS TREATED TO DATE: 8
RAD ONC ARIA PLAN ID: NORMAL
RAD ONC ARIA PLAN PRESCRIBED DOSE PER FRACTION: 2.4 GY
RAD ONC ARIA PLAN PRIMARY REFERENCE POINT: NORMAL
RAD ONC ARIA PLAN TOTAL FRACTIONS PRESCRIBED: 20
RAD ONC ARIA PLAN TOTAL PRESCRIBED DOSE: 4800 CGY
RAD ONC ARIA REFERENCE POINT DOSAGE GIVEN TO DATE: 19.2 GY
RAD ONC ARIA REFERENCE POINT ID: NORMAL
RAD ONC ARIA REFERENCE POINT SESSION DOSAGE GIVEN: 2.4 GY

## 2024-12-12 PROCEDURE — 77412 RADIATION TX DELIVERY LVL 3: CPT

## 2024-12-13 ENCOUNTER — HOSPITAL ENCOUNTER (OUTPATIENT)
Dept: RADIATION ONCOLOGY | Age: 69
Setting detail: RECURRING SERIES
Discharge: HOME | End: 2024-12-16
Payer: MEDICARE

## 2024-12-13 LAB
RAD ONC ARIA COURSE FIRST TREATMENT DATE: NORMAL
RAD ONC ARIA COURSE ID: NORMAL
RAD ONC ARIA COURSE LAST TREATMENT DATE: NORMAL
RAD ONC ARIA COURSE SESSION NUMBER: 9
RAD ONC ARIA COURSE START DATE: NORMAL
RAD ONC ARIA COURSE TREATMENT ELAPSED DAYS: 10
RAD ONC ARIA PLAN FRACTIONS TREATED TO DATE: 9
RAD ONC ARIA PLAN ID: NORMAL
RAD ONC ARIA PLAN PRESCRIBED DOSE PER FRACTION: 2.4 GY
RAD ONC ARIA PLAN PRIMARY REFERENCE POINT: NORMAL
RAD ONC ARIA PLAN TOTAL FRACTIONS PRESCRIBED: 20
RAD ONC ARIA PLAN TOTAL PRESCRIBED DOSE: 4800 CGY
RAD ONC ARIA REFERENCE POINT DOSAGE GIVEN TO DATE: 21.6 GY
RAD ONC ARIA REFERENCE POINT ID: NORMAL
RAD ONC ARIA REFERENCE POINT SESSION DOSAGE GIVEN: 2.4 GY

## 2024-12-13 PROCEDURE — 77412 RADIATION TX DELIVERY LVL 3: CPT

## 2024-12-16 ENCOUNTER — HOSPITAL ENCOUNTER (OUTPATIENT)
Dept: RADIATION ONCOLOGY | Age: 69
Setting detail: RECURRING SERIES
Discharge: HOME | End: 2024-12-19
Payer: MEDICARE

## 2024-12-16 LAB
RAD ONC ARIA COURSE FIRST TREATMENT DATE: NORMAL
RAD ONC ARIA COURSE ID: NORMAL
RAD ONC ARIA COURSE LAST TREATMENT DATE: NORMAL
RAD ONC ARIA COURSE SESSION NUMBER: 10
RAD ONC ARIA COURSE START DATE: NORMAL
RAD ONC ARIA COURSE TREATMENT ELAPSED DAYS: 13
RAD ONC ARIA PLAN FRACTIONS TREATED TO DATE: 10
RAD ONC ARIA PLAN ID: NORMAL
RAD ONC ARIA PLAN PRESCRIBED DOSE PER FRACTION: 2.4 GY
RAD ONC ARIA PLAN PRIMARY REFERENCE POINT: NORMAL
RAD ONC ARIA PLAN TOTAL FRACTIONS PRESCRIBED: 20
RAD ONC ARIA PLAN TOTAL PRESCRIBED DOSE: 4800 CGY
RAD ONC ARIA REFERENCE POINT DOSAGE GIVEN TO DATE: 24 GY
RAD ONC ARIA REFERENCE POINT ID: NORMAL
RAD ONC ARIA REFERENCE POINT SESSION DOSAGE GIVEN: 2.4 GY

## 2024-12-16 PROCEDURE — 77412 RADIATION TX DELIVERY LVL 3: CPT

## 2024-12-16 NOTE — ON TREATMENT VISIT
[facility] RADIATION ONCOLOGY ON TREATMENT VISIT    Patient: Yair Gee MRN: [id number]  SSN: [id number]    YOB: 1955  Age: 69 y.o.  Sex: male      12/16/24    Diagnosis:  Recurrent melanoma    This is a 69 y.o. male who is currently receiving adjuvant radiation therapy.    Current RT dose: 24/48 Gy in 10/20 fractions.     No concurrent systemic therapy    Subjective:  Week 1: No complaints.   Week 2: No complaints.     Objective:  There were no vitals filed for this visit.  Pain 0/10    General: Alert and conversant, in NAD  Skin: Intact.     Assessment:  Patient is tolerating radiation therapy well without treatment related toxicities at this time.     Plan:  -Moisturizer at night.  -Continue RT as planned.  -Treatment images reviewed.  -The patient has a documented plan of care to address pain.  Pain is not present.    Rubi Sherman MD  12/16/24

## 2024-12-17 ENCOUNTER — HOSPITAL ENCOUNTER (OUTPATIENT)
Dept: RADIATION ONCOLOGY | Age: 69
Setting detail: RECURRING SERIES
Discharge: HOME | End: 2024-12-20
Payer: MEDICARE

## 2024-12-17 LAB
RAD ONC ARIA COURSE FIRST TREATMENT DATE: NORMAL
RAD ONC ARIA COURSE ID: NORMAL
RAD ONC ARIA COURSE LAST TREATMENT DATE: NORMAL
RAD ONC ARIA COURSE SESSION NUMBER: 11
RAD ONC ARIA COURSE START DATE: NORMAL
RAD ONC ARIA COURSE TREATMENT ELAPSED DAYS: 14
RAD ONC ARIA PLAN FRACTIONS TREATED TO DATE: 11
RAD ONC ARIA PLAN ID: NORMAL
RAD ONC ARIA PLAN PRESCRIBED DOSE PER FRACTION: 2.4 GY
RAD ONC ARIA PLAN PRIMARY REFERENCE POINT: NORMAL
RAD ONC ARIA PLAN TOTAL FRACTIONS PRESCRIBED: 20
RAD ONC ARIA PLAN TOTAL PRESCRIBED DOSE: 4800 CGY
RAD ONC ARIA REFERENCE POINT DOSAGE GIVEN TO DATE: 26.4 GY
RAD ONC ARIA REFERENCE POINT ID: NORMAL
RAD ONC ARIA REFERENCE POINT SESSION DOSAGE GIVEN: 2.4 GY

## 2024-12-17 PROCEDURE — 77336 RADIATION PHYSICS CONSULT: CPT

## 2024-12-17 PROCEDURE — 77412 RADIATION TX DELIVERY LVL 3: CPT

## 2024-12-18 ENCOUNTER — HOSPITAL ENCOUNTER (OUTPATIENT)
Dept: RADIATION ONCOLOGY | Age: 69
Setting detail: RECURRING SERIES
Discharge: HOME | End: 2024-12-21
Payer: MEDICARE

## 2024-12-18 LAB
RAD ONC ARIA COURSE FIRST TREATMENT DATE: NORMAL
RAD ONC ARIA COURSE ID: NORMAL
RAD ONC ARIA COURSE LAST TREATMENT DATE: NORMAL
RAD ONC ARIA COURSE SESSION NUMBER: 12
RAD ONC ARIA COURSE START DATE: NORMAL
RAD ONC ARIA COURSE TREATMENT ELAPSED DAYS: 15
RAD ONC ARIA PLAN FRACTIONS TREATED TO DATE: 12
RAD ONC ARIA PLAN ID: NORMAL
RAD ONC ARIA PLAN PRESCRIBED DOSE PER FRACTION: 2.4 GY
RAD ONC ARIA PLAN PRIMARY REFERENCE POINT: NORMAL
RAD ONC ARIA PLAN TOTAL FRACTIONS PRESCRIBED: 20
RAD ONC ARIA PLAN TOTAL PRESCRIBED DOSE: 4800 CGY
RAD ONC ARIA REFERENCE POINT DOSAGE GIVEN TO DATE: 28.8 GY
RAD ONC ARIA REFERENCE POINT ID: NORMAL
RAD ONC ARIA REFERENCE POINT SESSION DOSAGE GIVEN: 2.4 GY

## 2024-12-18 PROCEDURE — 77412 RADIATION TX DELIVERY LVL 3: CPT

## 2024-12-19 ENCOUNTER — HOSPITAL ENCOUNTER (OUTPATIENT)
Dept: RADIATION ONCOLOGY | Age: 69
Setting detail: RECURRING SERIES
Discharge: HOME | End: 2024-12-22
Payer: MEDICARE

## 2024-12-19 LAB
RAD ONC ARIA COURSE FIRST TREATMENT DATE: NORMAL
RAD ONC ARIA COURSE ID: NORMAL
RAD ONC ARIA COURSE LAST TREATMENT DATE: NORMAL
RAD ONC ARIA COURSE SESSION NUMBER: 13
RAD ONC ARIA COURSE START DATE: NORMAL
RAD ONC ARIA COURSE TREATMENT ELAPSED DAYS: 16
RAD ONC ARIA PLAN FRACTIONS TREATED TO DATE: 13
RAD ONC ARIA PLAN ID: NORMAL
RAD ONC ARIA PLAN PRESCRIBED DOSE PER FRACTION: 2.4 GY
RAD ONC ARIA PLAN PRIMARY REFERENCE POINT: NORMAL
RAD ONC ARIA PLAN TOTAL FRACTIONS PRESCRIBED: 20
RAD ONC ARIA PLAN TOTAL PRESCRIBED DOSE: 4800 CGY
RAD ONC ARIA REFERENCE POINT DOSAGE GIVEN TO DATE: 31.2 GY
RAD ONC ARIA REFERENCE POINT ID: NORMAL
RAD ONC ARIA REFERENCE POINT SESSION DOSAGE GIVEN: 2.4 GY

## 2024-12-19 PROCEDURE — 77412 RADIATION TX DELIVERY LVL 3: CPT

## 2024-12-20 ENCOUNTER — HOSPITAL ENCOUNTER (OUTPATIENT)
Dept: RADIATION ONCOLOGY | Age: 69
Setting detail: RECURRING SERIES
Discharge: HOME | End: 2024-12-23
Payer: MEDICARE

## 2024-12-20 LAB
RAD ONC ARIA COURSE FIRST TREATMENT DATE: NORMAL
RAD ONC ARIA COURSE ID: NORMAL
RAD ONC ARIA COURSE LAST TREATMENT DATE: NORMAL
RAD ONC ARIA COURSE SESSION NUMBER: 14
RAD ONC ARIA COURSE START DATE: NORMAL
RAD ONC ARIA COURSE TREATMENT ELAPSED DAYS: 17
RAD ONC ARIA PLAN FRACTIONS TREATED TO DATE: 14
RAD ONC ARIA PLAN ID: NORMAL
RAD ONC ARIA PLAN PRESCRIBED DOSE PER FRACTION: 2.4 GY
RAD ONC ARIA PLAN PRIMARY REFERENCE POINT: NORMAL
RAD ONC ARIA PLAN TOTAL FRACTIONS PRESCRIBED: 20
RAD ONC ARIA PLAN TOTAL PRESCRIBED DOSE: 4800 CGY
RAD ONC ARIA REFERENCE POINT DOSAGE GIVEN TO DATE: 33.6 GY
RAD ONC ARIA REFERENCE POINT ID: NORMAL
RAD ONC ARIA REFERENCE POINT SESSION DOSAGE GIVEN: 2.4 GY

## 2024-12-20 PROCEDURE — 77412 RADIATION TX DELIVERY LVL 3: CPT

## 2024-12-23 ENCOUNTER — HOSPITAL ENCOUNTER (OUTPATIENT)
Dept: RADIATION ONCOLOGY | Age: 69
Setting detail: RECURRING SERIES
Discharge: HOME | End: 2024-12-26
Payer: MEDICARE

## 2024-12-23 LAB
RAD ONC ARIA COURSE FIRST TREATMENT DATE: NORMAL
RAD ONC ARIA COURSE ID: NORMAL
RAD ONC ARIA COURSE LAST TREATMENT DATE: NORMAL
RAD ONC ARIA COURSE SESSION NUMBER: 15
RAD ONC ARIA COURSE START DATE: NORMAL
RAD ONC ARIA COURSE TREATMENT ELAPSED DAYS: 20
RAD ONC ARIA PLAN FRACTIONS TREATED TO DATE: 15
RAD ONC ARIA PLAN ID: NORMAL
RAD ONC ARIA PLAN PRESCRIBED DOSE PER FRACTION: 2.4 GY
RAD ONC ARIA PLAN PRIMARY REFERENCE POINT: NORMAL
RAD ONC ARIA PLAN TOTAL FRACTIONS PRESCRIBED: 20
RAD ONC ARIA PLAN TOTAL PRESCRIBED DOSE: 4800 CGY
RAD ONC ARIA REFERENCE POINT DOSAGE GIVEN TO DATE: 36 GY
RAD ONC ARIA REFERENCE POINT ID: NORMAL
RAD ONC ARIA REFERENCE POINT SESSION DOSAGE GIVEN: 2.4 GY

## 2024-12-23 PROCEDURE — 77336 RADIATION PHYSICS CONSULT: CPT

## 2024-12-23 PROCEDURE — 77412 RADIATION TX DELIVERY LVL 3: CPT

## 2024-12-23 NOTE — ON TREATMENT VISIT
[facility] RADIATION ONCOLOGY ON TREATMENT VISIT    Patient: Yair Gee MRN: [id number]  SSN: [id number]    YOB: 1955  Age: 69 y.o.  Sex: male      12/23/24    Diagnosis:  Recurrent melanoma    This is a 69 y.o. male who is currently receiving adjuvant radiation therapy.    Current RT dose: 36/48 Gy in 15/20 fractions.     No concurrent systemic therapy    Subjective:  Week 1: No complaints.   Week 2: No complaints.   Week 3: Skin redness and itching.    Objective:  There were no vitals filed for this visit.  Pain 0/10    General: Alert and conversant, in NAD  Skin: Erythema, no desquamation.     Assessment:  Patient is tolerating radiation therapy well with anticipated treatment related toxicities at this time.     Plan:  -Recommended starting Aquaphor for redness. May use benadryl cream or cortisone for itching. He will let us know if there is desquamation, would send silvadene if this occurs.   -Continue RT as planned.  -Treatment images reviewed.  -He completes radiation therapy 12/31/24, I will coordinate next scans and follow up with Dr. Bone.   -The patient has a documented plan of care to address pain.  Pain is not present.    Rubi Sherman MD  12/23/24

## 2024-12-24 ENCOUNTER — HOSPITAL ENCOUNTER (OUTPATIENT)
Dept: RADIATION ONCOLOGY | Age: 69
Setting detail: RECURRING SERIES
Discharge: HOME | End: 2024-12-27
Payer: MEDICARE

## 2024-12-24 LAB
RAD ONC ARIA COURSE FIRST TREATMENT DATE: NORMAL
RAD ONC ARIA COURSE ID: NORMAL
RAD ONC ARIA COURSE LAST TREATMENT DATE: NORMAL
RAD ONC ARIA COURSE SESSION NUMBER: 16
RAD ONC ARIA COURSE START DATE: NORMAL
RAD ONC ARIA COURSE TREATMENT ELAPSED DAYS: 21
RAD ONC ARIA PLAN FRACTIONS TREATED TO DATE: 16
RAD ONC ARIA PLAN ID: NORMAL
RAD ONC ARIA PLAN PRESCRIBED DOSE PER FRACTION: 2.4 GY
RAD ONC ARIA PLAN PRIMARY REFERENCE POINT: NORMAL
RAD ONC ARIA PLAN TOTAL FRACTIONS PRESCRIBED: 20
RAD ONC ARIA PLAN TOTAL PRESCRIBED DOSE: 4800 CGY
RAD ONC ARIA REFERENCE POINT DOSAGE GIVEN TO DATE: 38.4 GY
RAD ONC ARIA REFERENCE POINT ID: NORMAL
RAD ONC ARIA REFERENCE POINT SESSION DOSAGE GIVEN: 2.4 GY

## 2024-12-24 PROCEDURE — 77412 RADIATION TX DELIVERY LVL 3: CPT

## 2024-12-26 ENCOUNTER — HOSPITAL ENCOUNTER (OUTPATIENT)
Dept: RADIATION ONCOLOGY | Age: 69
Setting detail: RECURRING SERIES
Discharge: HOME | End: 2024-12-29
Payer: MEDICARE

## 2024-12-26 LAB
RAD ONC ARIA COURSE FIRST TREATMENT DATE: NORMAL
RAD ONC ARIA COURSE ID: NORMAL
RAD ONC ARIA COURSE LAST TREATMENT DATE: NORMAL
RAD ONC ARIA COURSE SESSION NUMBER: 17
RAD ONC ARIA COURSE START DATE: NORMAL
RAD ONC ARIA COURSE TREATMENT ELAPSED DAYS: 23
RAD ONC ARIA PLAN FRACTIONS TREATED TO DATE: 17
RAD ONC ARIA PLAN ID: NORMAL
RAD ONC ARIA PLAN PRESCRIBED DOSE PER FRACTION: 2.4 GY
RAD ONC ARIA PLAN PRIMARY REFERENCE POINT: NORMAL
RAD ONC ARIA PLAN TOTAL FRACTIONS PRESCRIBED: 20
RAD ONC ARIA PLAN TOTAL PRESCRIBED DOSE: 4800 CGY
RAD ONC ARIA REFERENCE POINT DOSAGE GIVEN TO DATE: 40.8 GY
RAD ONC ARIA REFERENCE POINT ID: NORMAL
RAD ONC ARIA REFERENCE POINT SESSION DOSAGE GIVEN: 2.4 GY

## 2024-12-26 PROCEDURE — 77412 RADIATION TX DELIVERY LVL 3: CPT

## 2024-12-27 ENCOUNTER — HOSPITAL ENCOUNTER (OUTPATIENT)
Dept: RADIATION ONCOLOGY | Age: 69
Setting detail: RECURRING SERIES
Discharge: HOME | End: 2024-12-30
Payer: MEDICARE

## 2024-12-27 LAB
RAD ONC ARIA COURSE FIRST TREATMENT DATE: NORMAL
RAD ONC ARIA COURSE ID: NORMAL
RAD ONC ARIA COURSE LAST TREATMENT DATE: NORMAL
RAD ONC ARIA COURSE SESSION NUMBER: 18
RAD ONC ARIA COURSE START DATE: NORMAL
RAD ONC ARIA COURSE TREATMENT ELAPSED DAYS: 24
RAD ONC ARIA PLAN FRACTIONS TREATED TO DATE: 18
RAD ONC ARIA PLAN ID: NORMAL
RAD ONC ARIA PLAN PRESCRIBED DOSE PER FRACTION: 2.4 GY
RAD ONC ARIA PLAN PRIMARY REFERENCE POINT: NORMAL
RAD ONC ARIA PLAN TOTAL FRACTIONS PRESCRIBED: 20
RAD ONC ARIA PLAN TOTAL PRESCRIBED DOSE: 4800 CGY
RAD ONC ARIA REFERENCE POINT DOSAGE GIVEN TO DATE: 43.2 GY
RAD ONC ARIA REFERENCE POINT ID: NORMAL
RAD ONC ARIA REFERENCE POINT SESSION DOSAGE GIVEN: 2.4 GY

## 2024-12-27 PROCEDURE — 77412 RADIATION TX DELIVERY LVL 3: CPT

## 2024-12-30 ENCOUNTER — HOSPITAL ENCOUNTER (OUTPATIENT)
Dept: RADIATION ONCOLOGY | Age: 69
Setting detail: RECURRING SERIES
Discharge: HOME | End: 2025-01-02
Payer: MEDICARE

## 2024-12-30 DIAGNOSIS — C77.3 SECONDARY AND UNSPECIFIED MALIGNANT NEOPLASM OF AXILLA AND UPPER LIMB LYMPH NODES (HCC): ICD-10-CM

## 2024-12-30 DIAGNOSIS — C43.62 MALIGNANT MELANOMA OF LEFT UPPER EXTREMITY INCLUDING SHOULDER (HCC): Primary | ICD-10-CM

## 2024-12-30 LAB
RAD ONC ARIA COURSE FIRST TREATMENT DATE: NORMAL
RAD ONC ARIA COURSE ID: NORMAL
RAD ONC ARIA COURSE LAST TREATMENT DATE: NORMAL
RAD ONC ARIA COURSE SESSION NUMBER: 19
RAD ONC ARIA COURSE START DATE: NORMAL
RAD ONC ARIA COURSE TREATMENT ELAPSED DAYS: 27
RAD ONC ARIA PLAN FRACTIONS TREATED TO DATE: 19
RAD ONC ARIA PLAN ID: NORMAL
RAD ONC ARIA PLAN PRESCRIBED DOSE PER FRACTION: 2.4 GY
RAD ONC ARIA PLAN PRIMARY REFERENCE POINT: NORMAL
RAD ONC ARIA PLAN TOTAL FRACTIONS PRESCRIBED: 20
RAD ONC ARIA PLAN TOTAL PRESCRIBED DOSE: 4800 CGY
RAD ONC ARIA REFERENCE POINT DOSAGE GIVEN TO DATE: 45.6 GY
RAD ONC ARIA REFERENCE POINT ID: NORMAL
RAD ONC ARIA REFERENCE POINT SESSION DOSAGE GIVEN: 2.4 GY

## 2024-12-30 PROCEDURE — 77412 RADIATION TX DELIVERY LVL 3: CPT

## 2024-12-30 RX ORDER — SILVER SULFADIAZINE 10 MG/G
CREAM TOPICAL
Qty: 50 G | Refills: 0 | Status: SHIPPED | OUTPATIENT
Start: 2024-12-30

## 2024-12-30 NOTE — ON TREATMENT VISIT
[facility] RADIATION ONCOLOGY ON TREATMENT VISIT    Patient: Yair Gee MRN: [id number]  SSN: [id number]    YOB: 1955  Age: 69 y.o.  Sex: male      12/30/24    Diagnosis:  Recurrent melanoma    This is a 69 y.o. male who is currently receiving adjuvant radiation therapy.    Current RT dose: 38/48 Gy in 19/20 fractions.     No concurrent systemic therapy    Subjective:  Week 1: No complaints.   Week 2: No complaints.   Week 3: Skin redness and itching.  Week 4: Skin redness and peeling.    Objective:  There were no vitals filed for this visit.  Pain 0/10    General: Alert and conversant, in NAD  Skin: Erythema, small areas of desquamation.     Assessment:  Patient is tolerating radiation therapy well with anticipated treatment related toxicities at this time.     Plan:  -Silvadene and Aquaphor for skin care. He will call if symptoms worsen.   -Continue RT as planned.  -Treatment images reviewed.  -He completes radiation therapy 12/31/24, follow up with radiation oncology and medical oncology 3/31/25 after repeat imaging.  -The patient has a documented plan of care to address pain.  Pain is not present.    Rubi Sherman MD  12/30/24

## 2024-12-31 ENCOUNTER — HOSPITAL ENCOUNTER (OUTPATIENT)
Dept: RADIATION ONCOLOGY | Age: 69
Setting detail: RECURRING SERIES
Discharge: HOME | End: 2025-01-03
Payer: MEDICARE

## 2024-12-31 LAB
RAD ONC ARIA COURSE FIRST TREATMENT DATE: NORMAL
RAD ONC ARIA COURSE ID: NORMAL
RAD ONC ARIA COURSE LAST TREATMENT DATE: NORMAL
RAD ONC ARIA COURSE SESSION NUMBER: 20
RAD ONC ARIA COURSE START DATE: NORMAL
RAD ONC ARIA COURSE TREATMENT ELAPSED DAYS: 28
RAD ONC ARIA PLAN FRACTIONS TREATED TO DATE: 20
RAD ONC ARIA PLAN ID: NORMAL
RAD ONC ARIA PLAN PRESCRIBED DOSE PER FRACTION: 2.4 GY
RAD ONC ARIA PLAN PRIMARY REFERENCE POINT: NORMAL
RAD ONC ARIA PLAN TOTAL FRACTIONS PRESCRIBED: 20
RAD ONC ARIA PLAN TOTAL PRESCRIBED DOSE: 4800 CGY
RAD ONC ARIA REFERENCE POINT DOSAGE GIVEN TO DATE: 48 GY
RAD ONC ARIA REFERENCE POINT ID: NORMAL
RAD ONC ARIA REFERENCE POINT SESSION DOSAGE GIVEN: 2.4 GY

## 2024-12-31 PROCEDURE — 77336 RADIATION PHYSICS CONSULT: CPT

## 2024-12-31 PROCEDURE — 77412 RADIATION TX DELIVERY LVL 3: CPT

## 2025-01-05 DIAGNOSIS — I10 ESSENTIAL HYPERTENSION: ICD-10-CM

## 2025-01-06 RX ORDER — HYDROCHLOROTHIAZIDE 12.5 MG/1
12.5 CAPSULE ORAL EVERY MORNING
Qty: 90 CAPSULE | Refills: 1 | OUTPATIENT
Start: 2025-01-06

## 2025-01-08 ENCOUNTER — TELEPHONE (OUTPATIENT)
Dept: ONCOLOGY | Age: 70
End: 2025-01-08

## 2025-01-08 ENCOUNTER — TELEPHONE (OUTPATIENT)
Dept: INTERNAL MEDICINE CLINIC | Facility: CLINIC | Age: 70
End: 2025-01-08

## 2025-01-08 DIAGNOSIS — I10 ESSENTIAL HYPERTENSION: ICD-10-CM

## 2025-01-08 NOTE — TELEPHONE ENCOUNTER
Pt has a appointment 1/10/2025 but due to the weather the provider is doing mychart VV only pt decided to R/S but needs refills on the pend medication

## 2025-01-09 DIAGNOSIS — I10 ESSENTIAL HYPERTENSION: ICD-10-CM

## 2025-01-09 RX ORDER — HYDROCHLOROTHIAZIDE 12.5 MG/1
12.5 CAPSULE ORAL EVERY MORNING
Qty: 90 CAPSULE | Refills: 1 | OUTPATIENT
Start: 2025-01-09

## 2025-01-09 RX ORDER — HYDROCHLOROTHIAZIDE 12.5 MG/1
12.5 CAPSULE ORAL EVERY MORNING
Qty: 30 CAPSULE | Refills: 0 | Status: SHIPPED | OUTPATIENT
Start: 2025-01-09

## 2025-01-13 ENCOUNTER — TELEPHONE (OUTPATIENT)
Dept: RADIATION ONCOLOGY | Age: 70
End: 2025-01-13

## 2025-01-13 RX ORDER — SILVER SULFADIAZINE 10 MG/G
CREAM TOPICAL
Qty: 400 G | Refills: 0 | Status: SHIPPED | OUTPATIENT
Start: 2025-01-13

## 2025-01-13 NOTE — TELEPHONE ENCOUNTER
Pt was called.  [place] instructions emailed to pt.  He is not experiencing pain.  Pt requested Silvadene reorder as his med was left in hotel room and discarded.    Rose Erickson RN  ----- Message from Dr. Rubi Sherman MD sent at 1/13/2025  7:36 AM EST -----  Could you call and let him know to use [place] soaks with the silvadene?  He can use Tylenol or Ibuprofen for pain- I can also send something stronger if he needs it  ----- Message -----  From: Rose Erickson RN  Sent: 1/13/2025   7:24 AM EST  To: Rubi Sherman MD    Pt called Friday and left VM.  He states the spot we treated is peeling and is worse.  RT end 12-31-24 left axilla.  Looks like he was given Silvadene.    Rose Erickson RN

## 2025-01-28 ENCOUNTER — OFFICE VISIT (OUTPATIENT)
Dept: INTERNAL MEDICINE CLINIC | Facility: CLINIC | Age: 70
End: 2025-01-28

## 2025-01-28 VITALS
OXYGEN SATURATION: 96 % | TEMPERATURE: 97.9 F | DIASTOLIC BLOOD PRESSURE: 106 MMHG | BODY MASS INDEX: 37.38 KG/M2 | SYSTOLIC BLOOD PRESSURE: 154 MMHG | WEIGHT: 267 LBS | HEIGHT: 71 IN | HEART RATE: 63 BPM

## 2025-01-28 DIAGNOSIS — E11.9 TYPE 2 DIABETES MELLITUS WITHOUT COMPLICATION, WITHOUT LONG-TERM CURRENT USE OF INSULIN (HCC): Primary | ICD-10-CM

## 2025-01-28 DIAGNOSIS — I49.9 IRREGULARLY IRREGULAR PULSE RHYTHM: ICD-10-CM

## 2025-01-28 DIAGNOSIS — Z12.5 SCREENING FOR PROSTATE CANCER: ICD-10-CM

## 2025-01-28 DIAGNOSIS — C43.62 MELANOMA OF LEFT UPPER ARM (HCC): ICD-10-CM

## 2025-01-28 DIAGNOSIS — E11.9 TYPE 2 DIABETES MELLITUS WITHOUT COMPLICATION, WITHOUT LONG-TERM CURRENT USE OF INSULIN (HCC): ICD-10-CM

## 2025-01-28 DIAGNOSIS — I10 PRIMARY HYPERTENSION: ICD-10-CM

## 2025-01-28 DIAGNOSIS — I48.91 ATRIAL FIBRILLATION, UNSPECIFIED TYPE (HCC): ICD-10-CM

## 2025-01-28 DIAGNOSIS — E66.01 MORBID (SEVERE) OBESITY DUE TO EXCESS CALORIES: ICD-10-CM

## 2025-01-28 LAB
EST. AVERAGE GLUCOSE BLD GHB EST-MCNC: 168 MG/DL
HBA1C MFR BLD: 7.5 % (ref 0–5.6)
PSA SERPL-MCNC: 0.6 NG/ML (ref 0–4)
TSH, 3RD GENERATION: 0.92 UIU/ML (ref 0.27–4.2)

## 2025-01-28 RX ORDER — CARVEDILOL 6.25 MG/1
6.25 TABLET ORAL 2 TIMES DAILY WITH MEALS
Qty: 180 TABLET | Refills: 1 | Status: SHIPPED | OUTPATIENT
Start: 2025-01-28

## 2025-01-28 RX ORDER — LISINOPRIL 40 MG/1
40 TABLET ORAL DAILY
Qty: 90 TABLET | Refills: 1 | Status: SHIPPED | OUTPATIENT
Start: 2025-01-28

## 2025-01-28 RX ORDER — HYDROCHLOROTHIAZIDE 12.5 MG/1
12.5 CAPSULE ORAL EVERY MORNING
Qty: 30 CAPSULE | Refills: 0 | Status: CANCELLED | OUTPATIENT
Start: 2025-01-28

## 2025-01-28 RX ORDER — HYDRALAZINE HYDROCHLORIDE 50 MG/1
50 TABLET, FILM COATED ORAL 3 TIMES DAILY
Qty: 270 TABLET | Refills: 1 | Status: SHIPPED | OUTPATIENT
Start: 2025-01-28

## 2025-01-28 RX ORDER — METFORMIN HYDROCHLORIDE 750 MG/1
TABLET, EXTENDED RELEASE ORAL
Qty: 90 TABLET | Refills: 1 | Status: SHIPPED | OUTPATIENT
Start: 2025-01-28

## 2025-01-28 RX ORDER — HYDROCHLOROTHIAZIDE 25 MG/1
25 TABLET ORAL EVERY MORNING
Qty: 90 TABLET | Refills: 1 | Status: SHIPPED | OUTPATIENT
Start: 2025-01-28

## 2025-01-28 SDOH — ECONOMIC STABILITY: FOOD INSECURITY: WITHIN THE PAST 12 MONTHS, YOU WORRIED THAT YOUR FOOD WOULD RUN OUT BEFORE YOU GOT MONEY TO BUY MORE.: NEVER TRUE

## 2025-01-28 SDOH — ECONOMIC STABILITY: FOOD INSECURITY: WITHIN THE PAST 12 MONTHS, THE FOOD YOU BOUGHT JUST DIDN'T LAST AND YOU DIDN'T HAVE MONEY TO GET MORE.: NEVER TRUE

## 2025-01-28 ASSESSMENT — ANXIETY QUESTIONNAIRES
6. BECOMING EASILY ANNOYED OR IRRITABLE: NOT AT ALL
3. WORRYING TOO MUCH ABOUT DIFFERENT THINGS: NOT AT ALL
1. FEELING NERVOUS, ANXIOUS, OR ON EDGE: NOT AT ALL
2. NOT BEING ABLE TO STOP OR CONTROL WORRYING: NOT AT ALL
IF YOU CHECKED OFF ANY PROBLEMS ON THIS QUESTIONNAIRE, HOW DIFFICULT HAVE THESE PROBLEMS MADE IT FOR YOU TO DO YOUR WORK, TAKE CARE OF THINGS AT HOME, OR GET ALONG WITH OTHER PEOPLE: NOT DIFFICULT AT ALL
4. TROUBLE RELAXING: NOT AT ALL
7. FEELING AFRAID AS IF SOMETHING AWFUL MIGHT HAPPEN: NOT AT ALL
GAD7 TOTAL SCORE: 0
5. BEING SO RESTLESS THAT IT IS HARD TO SIT STILL: NOT AT ALL

## 2025-01-28 ASSESSMENT — PATIENT HEALTH QUESTIONNAIRE - PHQ9
2. FEELING DOWN, DEPRESSED OR HOPELESS: NOT AT ALL
SUM OF ALL RESPONSES TO PHQ QUESTIONS 1-9: 0
1. LITTLE INTEREST OR PLEASURE IN DOING THINGS: NOT AT ALL
SUM OF ALL RESPONSES TO PHQ QUESTIONS 1-9: 0
SUM OF ALL RESPONSES TO PHQ9 QUESTIONS 1 & 2: 0

## 2025-01-28 ASSESSMENT — ENCOUNTER SYMPTOMS: SHORTNESS OF BREATH: 0

## 2025-01-28 NOTE — PROGRESS NOTES
[facility]  Santosh Rosales M.D.  Internal Medicine  [address] [place] [id number]  Office : [phone]  Fax : [phone]    Chief Complaint   Patient presents with    Diabetes     3 mo follow up       History of Present Illness:  Yair Gee is a 69 y.o. male.  HPI    Diabetes Mellitus  Patient presents  for follow up on diabetes.  Onset of symptoms was several years ago. Patient describes symptoms as none. Course to date has been well controlled.Diet and Lifestyle: follows a diabetic diet regularly  exercises sporadically  nonsmoker  Home glucose monitoring:  Results average n/a. Patient denies polyuria and polydipsia. No wounds in lower limbs.  Most recent HbA1c was   Hemoglobin A1C   Date Value Ref Range Status   07/09/2024 7.2 (H) 0 - 5.6 % Final     Comment:     Reference Range  Normal       <5.7%  Prediabetes  5.7-6.4%  Diabetes     >6.4%         Hypertension  Patient is in for Hypertension which is increasing in severity.    Diet and Lifestyle: generally follows a low sodium diet  Home BP Monitoring: Reviewed blood pressure diary with patient at todays visit..  Patient's recent blood pressures were   BP Readings from Last 3 Encounters:   01/28/25 (!) 154/106   11/25/24 (!) 161/77   11/19/24 (!) 148/82   .   not taking medications regularly as instructed, no medication side effects noted, no TIA's, no chest pain on exertion, no dyspnea on exertion, no swelling of ankles. Cardiac risk factors consist of advanced age (older than 55 for men, 65 for women), diabetes mellitus, hypertension, and male gender.  Lab Results   Component Value Date/Time     10/29/2024 09:45 AM    K 5.0 10/29/2024 09:45 AM     10/29/2024 09:45 AM    CO2 26 10/29/2024 09:45 AM    BUN 17 10/29/2024 09:45 AM    CREATININE 0.91 10/29/2024 09:45 AM    GLUCOSE 165 10/29/2024 09:45 AM    CALCIUM 9.4 10/29/2024 09:45 AM    LABGLOM >90 10/29/2024 09:45 AM    LABGLOM 59

## 2025-01-29 ENCOUNTER — TELEPHONE (OUTPATIENT)
Age: 70
End: 2025-01-29

## 2025-01-29 DIAGNOSIS — I48.0 PAROXYSMAL ATRIAL FIBRILLATION (HCC): Primary | ICD-10-CM

## 2025-01-29 NOTE — TELEPHONE ENCOUNTER
Has a referral to EP doctor here Lives in [place] but willing to go to [place] Please call patient for appointment

## 2025-02-03 NOTE — TELEPHONE ENCOUNTER
Called and left voicemail for EP consult. Patient will need echo scheduled as well. Pre-booked appointments.    Will follow up with [employer] message.

## 2025-02-11 LAB
ECHO AO ASC DIAM: 3.5 CM
ECHO AO ROOT DIAM: 3.6 CM
ECHO AV AREA PEAK VELOCITY: 3.3 SQUARE CENTIMETER
ECHO AV AREA VTI: 3 SQUARE CENTIMETER
ECHO AV MEAN GRADIENT: 3 MMHG
ECHO AV MEAN VELOCITY: 0.9 M/S
ECHO AV PEAK GRADIENT: 6 MMHG
ECHO AV PEAK VELOCITY: 1.2 M/S
ECHO AV VELOCITY RATIO: 0.58
ECHO AV VTI: 26.6 CM
ECHO LA DIAMETER: 4.9 CM
ECHO LA TO AORTIC ROOT RATIO: 1.36
ECHO LA VOL BP: 125 ML (ref 18–58)
ECHO LV E' LATERAL VELOCITY: 9.42 CM/S
ECHO LV E' SEPTAL VELOCITY: 5.23 CM/S
ECHO LV EDV A2C: 124 ML
ECHO LV EDV A4C: 153 ML
ECHO LV EJECTION FRACTION A2C: 48 %
ECHO LV EJECTION FRACTION A4C: 46 %
ECHO LV EJECTION FRACTION BIPLANE: 47 % (ref 55–100)
ECHO LV ESV A2C: 65 ML
ECHO LV ESV A4C: 83 ML
ECHO LV FRACTIONAL SHORTENING: 26 % (ref 28–44)
ECHO LV INTERNAL DIMENSION DIASTOLIC: 6.5 CM (ref 4.2–5.9)
ECHO LV INTERNAL DIMENSION SYSTOLIC: 4.8 CM
ECHO LV IVSD: 1 CM (ref 0.6–1)
ECHO LV MASS 2D: 301.3 G (ref 88–224)
ECHO LV POSTERIOR WALL DIASTOLIC: 1.1 CM (ref 0.6–1)
ECHO LV RELATIVE WALL THICKNESS RATIO: 0.34
ECHO LVOT AREA: 5.7 CM2
ECHO LVOT AV VTI INDEX: 0.51
ECHO LVOT DIAM: 2.7 CM
ECHO LVOT MEAN GRADIENT: 1 MMHG
ECHO LVOT PEAK GRADIENT: 2 MMHG
ECHO LVOT PEAK VELOCITY: 0.7 M/S
ECHO LVOT SV: 77.3 ML
ECHO LVOT VTI: 13.5 CM
ECHO MV A VELOCITY: 0.65 M/S
ECHO MV E VELOCITY: 0.54 M/S
ECHO MV E/A RATIO: 0.83
ECHO MV E/E' LATERAL: 5.73
ECHO MV E/E' RATIO (AVERAGED): 8.03
ECHO MV E/E' SEPTAL: 10.33
ECHO PV MAX VELOCITY: 1.2 M/S
ECHO PV PEAK GRADIENT: 5 MMHG
ECHO RV BASAL DIMENSION: 3.5 CM
ECHO RV INTERNAL DIMENSION: 4.1 CM
ECHO RV MID DIMENSION: 2.7 CM
ECHO RV TAPSE: 1.8 CM (ref 1.7–?)

## 2025-02-26 ENCOUNTER — TELEPHONE (OUTPATIENT)
Age: 70
End: 2025-02-26

## 2025-02-26 ENCOUNTER — CLINICAL DOCUMENTATION (OUTPATIENT)
Age: 70
End: 2025-02-26

## 2025-02-26 ENCOUNTER — INITIAL CONSULT (OUTPATIENT)
Age: 70
End: 2025-02-26

## 2025-02-26 VITALS
DIASTOLIC BLOOD PRESSURE: 94 MMHG | HEART RATE: 65 BPM | SYSTOLIC BLOOD PRESSURE: 142 MMHG | HEIGHT: 71 IN | BODY MASS INDEX: 35.71 KG/M2 | WEIGHT: 255.1 LBS

## 2025-02-26 DIAGNOSIS — I10 PRIMARY HYPERTENSION: ICD-10-CM

## 2025-02-26 DIAGNOSIS — I48.19 PERSISTENT ATRIAL FIBRILLATION (HCC): Primary | ICD-10-CM

## 2025-02-26 PROBLEM — I48.91 AF (ATRIAL FIBRILLATION) (HCC): Status: ACTIVE | Noted: 2025-02-26

## 2025-02-26 NOTE — PROGRESS NOTES
Real AF study discussed in length with patient at Dr. Vital' request. Patient was given time to review the consent. After review, patient was able to verbalize understanding of the study's purpose, design, risks, and benefits. The patient understands that this study is completely voluntary. Financial aspects of the study were reviewed with the patient and he denies any questions at present. Other alternatives were also discussed with patient. After all questions were answered, patient verbalized his desire to be a part of the study. Informed consent was signed prior to starting any study procedure. he was given a copy of his consent, as well as, a copy was placed in his chart. he has no other questions at this time.

## 2025-02-26 NOTE — TELEPHONE ENCOUNTER
Orders placed per verbal from Dr. Vital.     Reviewed pre-procedure packet information.     Understanding verbalized.     Patient checked out and provided with procedure instruction packet.

## 2025-03-03 DIAGNOSIS — I48.91 ATRIAL FIBRILLATION, UNSPECIFIED TYPE (HCC): ICD-10-CM

## 2025-03-03 NOTE — TELEPHONE ENCOUNTER
MEDICATION REFILL REQUEST      Name of Medication:  Eliquis   Dose:  5 mg  Frequency:  twice a day   Quantity:    Days' supply:  30 day       Pharmacy Name/Location:  [facility] / Please call patient if any problems

## 2025-03-03 NOTE — TELEPHONE ENCOUNTER
Spoke to patient, continuing refills of Eliquis should be taken care of by his Cardiologisy - understanding was expressed.

## 2025-03-03 NOTE — TELEPHONE ENCOUNTER
Requested Prescriptions     Pending Prescriptions Disp Refills    apixaban (ELIQUIS) 5 MG TABS tablet 60 tablet 11     Sig: Take 1 tablet by mouth 2 times daily     Rx verified last ov 2/26/25

## 2025-03-03 NOTE — TELEPHONE ENCOUNTER
Refill of Eliquis 5 mg, taking one tab bid, has been requested.  Last prescribed 1-28-25 #60 0RF  Last seen 1-28-25  Next OV 4-29-25  [facility] in [place]     no

## 2025-03-06 NOTE — TELEPHONE ENCOUNTER
Please call patient with update on medication needed. Patient is now out. Please call patient today

## 2025-03-24 DIAGNOSIS — I48.19 PERSISTENT ATRIAL FIBRILLATION (HCC): ICD-10-CM

## 2025-03-24 LAB
ANION GAP SERPL CALC-SCNC: 10 MMOL/L (ref 7–16)
BUN SERPL-MCNC: 24 MG/DL (ref 8–23)
CALCIUM SERPL-MCNC: 9.7 MG/DL (ref 8.8–10.2)
CHLORIDE SERPL-SCNC: 99 MMOL/L (ref 98–107)
CO2 SERPL-SCNC: 26 MMOL/L (ref 20–29)
CREAT SERPL-MCNC: 0.86 MG/DL (ref 0.8–1.3)
ERYTHROCYTE [DISTWIDTH] IN BLOOD BY AUTOMATED COUNT: 14.1 % (ref 11.9–14.6)
GLUCOSE SERPL-MCNC: 142 MG/DL (ref 70–99)
HCT VFR BLD AUTO: 40.7 % (ref 41.1–50.3)
HGB BLD-MCNC: 13.3 G/DL (ref 13.6–17.2)
MAGNESIUM SERPL-MCNC: 1.8 MG/DL (ref 1.8–2.4)
MCH RBC QN AUTO: 32.9 PG (ref 26.1–32.9)
MCHC RBC AUTO-ENTMCNC: 32.7 G/DL (ref 31.4–35)
MCV RBC AUTO: 100.7 FL (ref 82–102)
NRBC # BLD: 0 K/UL (ref 0–0.2)
PLATELET # BLD AUTO: 226 K/UL (ref 150–450)
PMV BLD AUTO: 10.8 FL (ref 9.4–12.3)
POTASSIUM SERPL-SCNC: 4.3 MMOL/L (ref 3.5–5.1)
RBC # BLD AUTO: 4.04 M/UL (ref 4.23–5.6)
SODIUM SERPL-SCNC: 135 MMOL/L (ref 136–145)
WBC # BLD AUTO: 6.9 K/UL (ref 4.3–11.1)

## 2025-03-31 ENCOUNTER — HOSPITAL ENCOUNTER (OUTPATIENT)
Dept: CT IMAGING | Age: 70
Discharge: HOME | End: 2025-04-02
Attending: RADIOLOGY
Payer: MEDICARE

## 2025-03-31 DIAGNOSIS — C77.3 SECONDARY AND UNSPECIFIED MALIGNANT NEOPLASM OF AXILLA AND UPPER LIMB LYMPH NODES: ICD-10-CM

## 2025-03-31 DIAGNOSIS — C43.62 MALIGNANT MELANOMA OF LEFT UPPER EXTREMITY INCLUDING SHOULDER: ICD-10-CM

## 2025-03-31 PROCEDURE — 74177 CT ABD & PELVIS W/CONTRAST: CPT

## 2025-03-31 PROCEDURE — 6360000004 HC RX CONTRAST MEDICATION: Performed by: RADIOLOGY

## 2025-03-31 RX ORDER — IOPAMIDOL 755 MG/ML
100 INJECTION, SOLUTION INTRAVASCULAR
Status: COMPLETED | OUTPATIENT
Start: 2025-03-31 | End: 2025-03-31

## 2025-03-31 RX ORDER — DIATRIZOATE MEGLUMINE AND DIATRIZOATE SODIUM 660; 100 MG/ML; MG/ML
15 SOLUTION ORAL; RECTAL
Status: DISCONTINUED | OUTPATIENT
Start: 2025-03-31 | End: 2025-04-03 | Stop reason: HOSPADM

## 2025-03-31 RX ADMIN — IOPAMIDOL 100 ML: 755 INJECTION, SOLUTION INTRAVENOUS at 10:44

## 2025-03-31 RX ADMIN — DIATRIZOATE MEGLUMINE AND DIATRIZOATE SODIUM 15 ML: 660; 100 LIQUID ORAL; RECTAL at 10:44

## 2025-04-02 ENCOUNTER — ANESTHESIA EVENT (OUTPATIENT)
Dept: CARDIAC CATH/INVASIVE PROCEDURES | Age: 70
End: 2025-04-02
Payer: MEDICARE

## 2025-04-02 RX ORDER — SODIUM CHLORIDE 9 MG/ML
INJECTION, SOLUTION INTRAVENOUS PRN
Status: CANCELLED | OUTPATIENT
Start: 2025-04-02

## 2025-04-02 RX ORDER — SODIUM CHLORIDE, SODIUM LACTATE, POTASSIUM CHLORIDE, CALCIUM CHLORIDE 600; 310; 30; 20 MG/100ML; MG/100ML; MG/100ML; MG/100ML
INJECTION, SOLUTION INTRAVENOUS CONTINUOUS
Status: CANCELLED | OUTPATIENT
Start: 2025-04-02

## 2025-04-02 RX ORDER — MIDAZOLAM HYDROCHLORIDE 2 MG/2ML
2 INJECTION, SOLUTION INTRAMUSCULAR; INTRAVENOUS
Status: CANCELLED | OUTPATIENT
Start: 2025-04-02

## 2025-04-02 RX ORDER — SODIUM CHLORIDE 0.9 % (FLUSH) 0.9 %
5-40 SYRINGE (ML) INJECTION PRN
Status: CANCELLED | OUTPATIENT
Start: 2025-04-02

## 2025-04-02 RX ORDER — SODIUM CHLORIDE 0.9 % (FLUSH) 0.9 %
5-40 SYRINGE (ML) INJECTION EVERY 12 HOURS SCHEDULED
Status: CANCELLED | OUTPATIENT
Start: 2025-04-02

## 2025-04-02 RX ORDER — DIPHENHYDRAMINE HYDROCHLORIDE 50 MG/ML
12.5 INJECTION, SOLUTION INTRAMUSCULAR; INTRAVENOUS
Status: CANCELLED | OUTPATIENT
Start: 2025-04-02

## 2025-04-02 RX ORDER — DEXTROSE MONOHYDRATE 100 MG/ML
INJECTION, SOLUTION INTRAVENOUS CONTINUOUS PRN
Status: CANCELLED | OUTPATIENT
Start: 2025-04-02

## 2025-04-02 RX ORDER — LIDOCAINE HYDROCHLORIDE 10 MG/ML
1 INJECTION, SOLUTION INFILTRATION; PERINEURAL
Status: CANCELLED | OUTPATIENT
Start: 2025-04-02

## 2025-04-02 RX ORDER — NALOXONE HYDROCHLORIDE 0.4 MG/ML
INJECTION, SOLUTION INTRAMUSCULAR; INTRAVENOUS; SUBCUTANEOUS PRN
Status: CANCELLED | OUTPATIENT
Start: 2025-04-02

## 2025-04-02 RX ORDER — IBUPROFEN 600 MG/1
1 TABLET ORAL PRN
Status: CANCELLED | OUTPATIENT
Start: 2025-04-02

## 2025-04-02 RX ORDER — OXYCODONE HYDROCHLORIDE 5 MG/1
5 TABLET ORAL
Refills: 0 | Status: CANCELLED | OUTPATIENT
Start: 2025-04-02

## 2025-04-02 RX ORDER — PROCHLORPERAZINE EDISYLATE 5 MG/ML
5 INJECTION INTRAMUSCULAR; INTRAVENOUS
Status: CANCELLED | OUTPATIENT
Start: 2025-04-02

## 2025-04-02 NOTE — PROGRESS NOTES
Patient pre-assessment complete for atrial fibrillation ablation scheduled for 4/3/25, arrival time 0600. Patient verified using . Patient instructed to bring a list of all home medications on the day of procedure. NPO status reinforced.  Patient instructed to HOLD Eliquis, Metformin, Hydrochlorothiazide, and Lisinopril. Instructed they can take all other medications excluding vitamins & supplements. Patient verbalizes understanding of all instructions & denies any questions at this time.

## 2025-04-03 ENCOUNTER — ANESTHESIA (OUTPATIENT)
Dept: CARDIAC CATH/INVASIVE PROCEDURES | Age: 70
End: 2025-04-03
Payer: MEDICARE

## 2025-04-03 ENCOUNTER — HOSPITAL ENCOUNTER (OUTPATIENT)
Age: 70
Setting detail: OUTPATIENT SURGERY
Discharge: HOME | End: 2025-04-03
Attending: INTERNAL MEDICINE | Admitting: INTERNAL MEDICINE
Payer: MEDICARE

## 2025-04-03 VITALS
DIASTOLIC BLOOD PRESSURE: 80 MMHG | SYSTOLIC BLOOD PRESSURE: 150 MMHG | WEIGHT: 255 LBS | BODY MASS INDEX: 35.7 KG/M2 | HEART RATE: 52 BPM | HEIGHT: 71 IN | TEMPERATURE: 97.5 F | OXYGEN SATURATION: 97 % | RESPIRATION RATE: 9 BRPM

## 2025-04-03 DIAGNOSIS — I48.91 AF (ATRIAL FIBRILLATION) (HCC): ICD-10-CM

## 2025-04-03 DIAGNOSIS — I48.91 UNSPECIFIED ATRIAL FIBRILLATION (HCC): Primary | ICD-10-CM

## 2025-04-03 LAB
ACT BLD: 273 SECS (ref 70–128)
ECHO BSA: 2.41 M2
GLUCOSE BLD STRIP.AUTO-MCNC: 139 MG/DL (ref 65–100)
GLUCOSE BLD STRIP.AUTO-MCNC: 146 MG/DL (ref 65–100)
SERVICE CMNT-IMP: ABNORMAL
SERVICE CMNT-IMP: ABNORMAL

## 2025-04-03 PROCEDURE — 2709999900 HC NON-CHARGEABLE SUPPLY: Performed by: INTERNAL MEDICINE

## 2025-04-03 PROCEDURE — C1732 CATH, EP, DIAG/ABL, 3D/VECT: HCPCS | Performed by: INTERNAL MEDICINE

## 2025-04-03 PROCEDURE — 93655 ICAR CATH ABLTJ DSCRT ARRHYT: CPT | Performed by: INTERNAL MEDICINE

## 2025-04-03 PROCEDURE — C1759 CATH, INTRA ECHOCARDIOGRAPHY: HCPCS | Performed by: INTERNAL MEDICINE

## 2025-04-03 PROCEDURE — C1730 CATH, EP, 19 OR FEW ELECT: HCPCS | Performed by: INTERNAL MEDICINE

## 2025-04-03 PROCEDURE — 2720000010 HC SURG SUPPLY STERILE: Performed by: INTERNAL MEDICINE

## 2025-04-03 PROCEDURE — 7100000000 HC PACU RECOVERY - FIRST 15 MIN: Performed by: INTERNAL MEDICINE

## 2025-04-03 PROCEDURE — 82962 GLUCOSE BLOOD TEST: CPT

## 2025-04-03 PROCEDURE — 7100000001 HC PACU RECOVERY - ADDTL 15 MIN: Performed by: INTERNAL MEDICINE

## 2025-04-03 PROCEDURE — 6360000002 HC RX W HCPCS: Performed by: NURSE ANESTHETIST, CERTIFIED REGISTERED

## 2025-04-03 PROCEDURE — C1760 CLOSURE DEV, VASC: HCPCS | Performed by: INTERNAL MEDICINE

## 2025-04-03 PROCEDURE — 3700000000 HC ANESTHESIA ATTENDED CARE: Performed by: INTERNAL MEDICINE

## 2025-04-03 PROCEDURE — 93657 TX L/R ATRIAL FIB ADDL: CPT | Performed by: INTERNAL MEDICINE

## 2025-04-03 PROCEDURE — 93622 COMP EP EVAL L VENTR PAC&REC: CPT | Performed by: INTERNAL MEDICINE

## 2025-04-03 PROCEDURE — 85347 COAGULATION TIME ACTIVATED: CPT

## 2025-04-03 PROCEDURE — C1894 INTRO/SHEATH, NON-LASER: HCPCS | Performed by: INTERNAL MEDICINE

## 2025-04-03 PROCEDURE — 6360000002 HC RX W HCPCS: Performed by: INTERNAL MEDICINE

## 2025-04-03 PROCEDURE — 6360000002 HC RX W HCPCS

## 2025-04-03 PROCEDURE — 2580000003 HC RX 258: Performed by: NURSE ANESTHETIST, CERTIFIED REGISTERED

## 2025-04-03 PROCEDURE — 2500000003 HC RX 250 WO HCPCS: Performed by: NURSE ANESTHETIST, CERTIFIED REGISTERED

## 2025-04-03 PROCEDURE — 3700000001 HC ADD 15 MINUTES (ANESTHESIA): Performed by: INTERNAL MEDICINE

## 2025-04-03 PROCEDURE — 93656 COMPRE EP EVAL ABLTJ ATR FIB: CPT | Performed by: INTERNAL MEDICINE

## 2025-04-03 PROCEDURE — C1893 INTRO/SHEATH, FIXED,NON-PEEL: HCPCS | Performed by: INTERNAL MEDICINE

## 2025-04-03 RX ORDER — LIDOCAINE HYDROCHLORIDE AND EPINEPHRINE 10; 10 MG/ML; UG/ML
INJECTION, SOLUTION INFILTRATION; PERINEURAL PRN
Status: DISCONTINUED | OUTPATIENT
Start: 2025-04-03 | End: 2025-04-03 | Stop reason: HOSPADM

## 2025-04-03 RX ORDER — ROCURONIUM BROMIDE 10 MG/ML
INJECTION, SOLUTION INTRAVENOUS
Status: DISCONTINUED | OUTPATIENT
Start: 2025-04-03 | End: 2025-04-03 | Stop reason: SDUPTHER

## 2025-04-03 RX ORDER — PHENYLEPHRINE HYDROCHLORIDE 10 MG/ML
INJECTION INTRAVENOUS
Status: DISCONTINUED | OUTPATIENT
Start: 2025-04-03 | End: 2025-04-03 | Stop reason: SDUPTHER

## 2025-04-03 RX ORDER — LIDOCAINE HYDROCHLORIDE 20 MG/ML
INJECTION, SOLUTION EPIDURAL; INFILTRATION; INTRACAUDAL; PERINEURAL
Status: DISCONTINUED | OUTPATIENT
Start: 2025-04-03 | End: 2025-04-03 | Stop reason: SDUPTHER

## 2025-04-03 RX ORDER — HEPARIN SODIUM 200 [USP'U]/100ML
INJECTION, SOLUTION INTRAVENOUS
Status: DISCONTINUED | OUTPATIENT
Start: 2025-04-03 | End: 2025-04-03 | Stop reason: SDUPTHER

## 2025-04-03 RX ORDER — ADENOSINE 3 MG/ML
INJECTION, SOLUTION INTRAVENOUS PRN
Status: DISCONTINUED | OUTPATIENT
Start: 2025-04-03 | End: 2025-04-03 | Stop reason: HOSPADM

## 2025-04-03 RX ORDER — COLCHICINE 0.6 MG/1
0.6 TABLET ORAL DAILY
Qty: 30 TABLET | Refills: 0 | Status: SHIPPED | OUTPATIENT
Start: 2025-04-03

## 2025-04-03 RX ORDER — PANTOPRAZOLE SODIUM 40 MG/1
40 TABLET, DELAYED RELEASE ORAL
Qty: 60 TABLET | Refills: 0 | Status: SHIPPED | OUTPATIENT
Start: 2025-04-03

## 2025-04-03 RX ORDER — SUCRALFATE 1 G/1
1 TABLET ORAL 4 TIMES DAILY
Qty: 120 TABLET | Refills: 0 | Status: SHIPPED | OUTPATIENT
Start: 2025-04-03

## 2025-04-03 RX ORDER — PROTAMINE SULFATE 10 MG/ML
INJECTION, SOLUTION INTRAVENOUS
Status: DISCONTINUED | OUTPATIENT
Start: 2025-04-03 | End: 2025-04-03 | Stop reason: SDUPTHER

## 2025-04-03 RX ORDER — HEPARIN SODIUM 1000 [USP'U]/ML
INJECTION, SOLUTION INTRAVENOUS; SUBCUTANEOUS
Status: DISCONTINUED | OUTPATIENT
Start: 2025-04-03 | End: 2025-04-03 | Stop reason: SDUPTHER

## 2025-04-03 RX ORDER — PROPOFOL 10 MG/ML
INJECTION, EMULSION INTRAVENOUS
Status: DISCONTINUED | OUTPATIENT
Start: 2025-04-03 | End: 2025-04-03 | Stop reason: SDUPTHER

## 2025-04-03 RX ORDER — SODIUM CHLORIDE, SODIUM LACTATE, POTASSIUM CHLORIDE, CALCIUM CHLORIDE 600; 310; 30; 20 MG/100ML; MG/100ML; MG/100ML; MG/100ML
INJECTION, SOLUTION INTRAVENOUS
Status: DISCONTINUED | OUTPATIENT
Start: 2025-04-03 | End: 2025-04-03 | Stop reason: SDUPTHER

## 2025-04-03 RX ORDER — EPHEDRINE SULFATE 5 MG/ML
INJECTION INTRAVENOUS
Status: DISCONTINUED | OUTPATIENT
Start: 2025-04-03 | End: 2025-04-03 | Stop reason: SDUPTHER

## 2025-04-03 RX ADMIN — SUGAMMADEX 200 MG: 100 INJECTION, SOLUTION INTRAVENOUS at 08:43

## 2025-04-03 RX ADMIN — HEPARIN SODIUM 40 UNITS/KG/HR: 200 INJECTION, SOLUTION INTRAVENOUS at 07:54

## 2025-04-03 RX ADMIN — ROCURONIUM BROMIDE 40 MG: 10 INJECTION, SOLUTION INTRAVENOUS at 07:24

## 2025-04-03 RX ADMIN — PROPOFOL 200 MG: 10 INJECTION, EMULSION INTRAVENOUS at 07:24

## 2025-04-03 RX ADMIN — HEPARIN SODIUM 18000 UNITS: 1000 INJECTION INTRAVENOUS; SUBCUTANEOUS at 07:54

## 2025-04-03 RX ADMIN — ROCURONIUM BROMIDE 30 MG: 10 INJECTION, SOLUTION INTRAVENOUS at 08:01

## 2025-04-03 RX ADMIN — PHENYLEPHRINE HYDROCHLORIDE 200 MCG: 10 INJECTION INTRAVENOUS at 07:32

## 2025-04-03 RX ADMIN — PHENYLEPHRINE HYDROCHLORIDE 200 MCG: 10 INJECTION INTRAVENOUS at 08:21

## 2025-04-03 RX ADMIN — PROTAMINE SULFATE 100 MG: 10 INJECTION, SOLUTION INTRAVENOUS at 08:38

## 2025-04-03 RX ADMIN — LIDOCAINE HYDROCHLORIDE 100 MG: 20 INJECTION, SOLUTION EPIDURAL; INFILTRATION; INTRACAUDAL; PERINEURAL at 07:24

## 2025-04-03 RX ADMIN — EPHEDRINE SULFATE 10 MG: 5 INJECTION INTRAVENOUS at 07:47

## 2025-04-03 RX ADMIN — HEPARIN SODIUM 1000 UNITS: 1000 INJECTION INTRAVENOUS; SUBCUTANEOUS at 08:15

## 2025-04-03 RX ADMIN — PHENYLEPHRINE HYDROCHLORIDE 200 MCG: 10 INJECTION INTRAVENOUS at 07:55

## 2025-04-03 RX ADMIN — ROCURONIUM BROMIDE 20 MG: 10 INJECTION, SOLUTION INTRAVENOUS at 08:28

## 2025-04-03 RX ADMIN — SODIUM CHLORIDE, SODIUM LACTATE, POTASSIUM CHLORIDE, AND CALCIUM CHLORIDE: 600; 310; 30; 20 INJECTION, SOLUTION INTRAVENOUS at 07:10

## 2025-04-03 RX ADMIN — PHENYLEPHRINE HYDROCHLORIDE 200 MCG: 10 INJECTION INTRAVENOUS at 07:51

## 2025-04-03 RX ADMIN — EPHEDRINE SULFATE 10 MG: 5 INJECTION INTRAVENOUS at 07:43

## 2025-04-03 NOTE — PROGRESS NOTES
[facility] Cardiology/Electrophyiology Consult                Date of  Admission: 4/3/2025  6:03 AM     Yair Gee is a 69 y.o. male admitted for AF (atrial fibrillation) ([facility]) [I48.91].      69 y.o. male with a past medical and cardiac history significant for HTN, DM, persistent atrial fibrillation and presents for scheduled ablation for afib. He has noted more fatigue and lower energy over the past few months, some intermittent palpitations, no chest pain, syncope. No prior arrhythmia, no prior MI.     Cardiac PMH: (Old records have been reviewed and summarized below)    Patient Active Problem List   Diagnosis    Melanoma of left upper arm (HCC)    Type 2 diabetes mellitus without complication    Hypertension    Axillary mass, left    Morbid (severe) obesity due to excess calories (E66.01)    Body mass index [BMI] 37.0-37.9, adult (Z68.37)    Persistent atrial fibrillation (HCC)    AF (atrial fibrillation) (HCC)       Past Medical History:   Diagnosis Date    Hypertension     managed with med    Melanoma of left upper arm (HCC)     Type 2 diabetes mellitus without complication (HCC)     oral reliant; does not check glucose; denies hypo sx; Last A1C 7.2 on 07/09/2024      Past Surgical History:   Procedure Laterality Date    AXILLARY SURGERY Left 10/10/2024    AXILLARY LESION BIOPSY EXCISION LEFT performed by Karishma Tate MD at [facility] MAIN OR    COLONOSCOPY  2022    PANCREAS BIOPSY      ROTATOR CUFF REPAIR Left     SKIN CANCER EXCISION Left     melanoma removed from axillary    UMBILICAL HERNIA REPAIR       Allergies   Allergen Reactions    Penicillins Rash    Shellfish Allergy Nausea And Vomiting     Pt states allergic to oysters      No family history on file.     No current facility-administered medications for this encounter.      Physical Exam  There were no vitals filed for this visit.    Physical Exam:  Gen: well appearing, well developed, NAD  Eyes: Pupils equal, EOMI  CV: irreg irreg, no M/R/G, normal JVD,

## 2025-04-03 NOTE — ANESTHESIA PRE PROCEDURE
Department of Anesthesiology  Preprocedure Note       Name:  Yair Gee   Age:  69 y.o.  :  1955                                          MRN:  [id number]         Date:  4/3/2025      Surgeon: Surgeon(s):  Braulio Vital MD    Procedure: Procedure(s):  Ablation A-fib w complete ep study    Medications prior to admission:   Prior to Admission medications    Medication Sig Start Date End Date Taking? Authorizing Provider   apixaban (ELIQUIS) 5 MG TABS tablet Take 1 tablet by mouth 2 times daily 3/6/25  Yes Stan Levine MD   carvedilol (COREG) 6.25 MG tablet Take 1 tablet by mouth 2 times daily (with meals) 25  Yes Santosh Rosales MD   hydrALAZINE (APRESOLINE) 50 MG tablet Take 1 tablet by mouth 3 times daily 25  Yes Santosh Rosales MD   lisinopril (PRINIVIL;ZESTRIL) 40 MG tablet Take 1 tablet by mouth daily 25  Yes Santosh Rosales MD   metFORMIN (GLUCOPHAGE-XR) 750 MG extended release tablet TAKE 1 TABLET BY MOUTH EVERY DAY WITH EVENING MEAL 25  Yes Santosh Rosales MD   hydroCHLOROthiazide (HYDRODIURIL) 25 MG tablet Take 1 tablet by mouth every morning 25  Yes Santosh Rosales MD   silver sulfADIAZINE (SILVADENE) 1 % cream Apply topically 3-5 times daily to areas of peeling 24  Yes Rubi Sherman MD   Multiple Vitamins-Minerals (THERAPEUTIC MULTIVITAMIN-MINERALS) tablet Take 1 tablet by mouth daily   Yes ProviderPranav MD       Current medications:    No current facility-administered medications for this encounter.       Allergies:    Allergies   Allergen Reactions   • Penicillins Rash   • Shellfish Allergy Nausea And Vomiting     Pt states allergic to oysters       Problem List:    Patient Active Problem List   Diagnosis Code   • Melanoma of left upper arm (HCC) C43.62   • Type 2 diabetes mellitus without complication E11.9   • Hypertension I10   • Axillary mass, left R22.32   • Morbid (severe) obesity due to excess calories (E66.01)

## 2025-04-03 NOTE — DISCHARGE INSTRUCTIONS
Atrial Fib Ablation Discharge Instructions    1 - Check the puncture site frequently for swelling or bleeding. If you see any bleeding, lie down and apply pressure over the area with a clean town or washcloth. Notify your doctor for any redness, swelling, drainage or oozing from the puncture site. Notify your doctor for any fever or chills.    2 - If the leg with the puncture becomes cold, numb or painful, call [facility] Cardiology at  [phone].    3 - Activity should be limited for the next 48 hours. Climb stairs as little as possible and avoid any stooping, bending or strenuous activity for 48 hours. No heavy lifting (anything over 10 pounds) for three days.    4 - Do not drive for the first 24 hours post ablation.    5 - You may resume your usual diet. Drink more fluids than usual.    6 - Have a responsible person drive you home and stay with you for at least 24 hours after your ablation.    7 -You may remove the bandage from your Right & Left Groin in 24 hours. You may shower in 24 hours. No tub baths, hot tubs or swimming for one week. Do not place any lotions, creams, powders, ointments over the puncture site for one week. You may place a clean band-aid over the puncture site each day for 5 days. Change this daily.    8 - Continue medicines for now including your blood thinner eliquis, and your rhythm medicine . These medicines should be continued until EP follow up.     9 - You will need to start Carafate, Colchicine and Protonix for one month. This is to protect your esophagus from a possible injury during the ablation procedure.     10 - If chest pain occurs (especially when taking a deep breath), it is likely due to pericarditis or inflammation around your heart sac which is related to the ablation procedure. It usually responds to ibuprofen at 400-600 mg every 6-8 hours as needed. If feels severe or unrelieved, please call office to discuss symptoms with the

## 2025-04-03 NOTE — PROGRESS NOTES
TRANSFER - IN REPORT:    Verbal report received from Kassidy JAIMES on Yair Ray Weeks  being received from PACU  for routine progression of patient care      Report consisted of patient's Situation, Background, Assessment and   Recommendations(SBAR).     Information from the following report(s) Nurse Handoff Report was reviewed with the receiving nurse.    Opportunity for questions and clarification was provided.      Assessment completed upon patient's arrival to unit and care assumed.

## 2025-04-03 NOTE — PROCEDURES
tools were advanced into the 8 Fr and 10 Fr sheaths, respectively; the sheaths were then removed. The collagen was then deployed and a 30 second dwell time was performed to allow for expansion of the collagen. The delivery tools were then removed with adequate hemostasis.     The patient tolerated the procedure well with no acute complications recognized. The patient left the EP lab in stable condition, in normal sinus rhythm. Just prior to pulling shealths, the ICE catheter was used to obtain ultrasound images and revealed no evidence of pericardial effusion.     Ablation Data:  Total procedure time: 55 minutes  LA Volume: 135 cc     Baseline Intervals    QRS duration: 78 msec  WY interval: 117 msec  RR interval: 900 msec  AH interval: 112 msec  HV interval: 43 msec    EP Study    AV [place]: 440 msec  VA [place]: < 420 msec    Complications: None    Summary:   1. Successful pulmonary vein isolation x4.  2. Creation of a line of bidirectional block at the cavotricuspid isthmus.  3.         Comprehensive EP study.  4. Pt tolerated the procedure well.       Braulio Vital MD, MS  Clinical Cardiac Electrophysiology  4/3/2025  8:44 AM

## 2025-04-03 NOTE — ANESTHESIA POSTPROCEDURE EVALUATION
Department of Anesthesiology  Postprocedure Note    Patient: Yair Gee  MRN: [id number]  YOB: 1955  Date of evaluation: 4/3/2025    Procedure Summary       Date: 04/03/25 Room / Location: D EP 2 ALL EVENTS / [facility] CARDIAC CATH LAB    Anesthesia Start: 0710 Anesthesia Stop: 0900    Procedure: Ablation A-fib w complete ep study Diagnosis:       Persistent atrial fibrillation (HCC)      (AF (atrial fibrillation) (HCC) [I48.91])    Providers: Braulio Vital MD Responsible Provider: Miah Tony MD    Anesthesia Type: general ASA Status: 3            Anesthesia Type: No value filed.    Meagan Phase I: Meagan Score: 10    Meagan Phase II:      Anesthesia Post Evaluation    Patient location during evaluation: PACU  Patient participation: complete - patient participated  Level of consciousness: awake and alert  Airway patency: patent  Nausea: well controlled.  Cardiovascular status: acceptable.  Respiratory status: acceptable  Hydration status: stable  Pain management: adequate    There were no known notable events for this encounter.

## 2025-04-03 NOTE — PERIOP NOTE
TRANSFER - OUT REPORT:    Verbal report given to THEODORE Chester on Yair Ray Weeks  being transferred to Cath Lab Recovery for routine post-op       Report consisted of patient’s Situation, Background, Assessment and   Recommendations(SBAR).     Information from the following report(s) Nurse Handoff Report, Adult Overview, Surgery Report, Cardiac Rhythm NSR, Quality Measures, and Neuro Assessment was reviewed with the receiving nurse.    Lines:   Peripheral IV 04/03/25 Right Antecubital (Active)   Site Assessment Clean, dry & intact 04/03/25 0915   Line Status Flushed 04/03/25 0915   Phlebitis Assessment No symptoms 04/03/25 0915   Infiltration Assessment 0 04/03/25 0915   Dressing Status Clean, dry & intact 04/03/25 0915   Dressing Type Transparent 04/03/25 0915       Peripheral IV 04/03/25 Right Hand (Active)   Site Assessment Clean, dry & intact 04/03/25 0915   Line Status Flushed 04/03/25 0915   Line Care Connections checked and tightened 04/03/25 0852   Phlebitis Assessment No symptoms 04/03/25 0915   Infiltration Assessment 0 04/03/25 0915   Alcohol Cap Used No 04/03/25 0852   Dressing Status Clean, dry & intact 04/03/25 0915   Dressing Type Transparent 04/03/25 0915        Opportunity for questions and clarification was provided.      Patient transported with:   Registered Nurse    VTE prophylaxis orders have been written for Yair Ray Weeks.    Patient and family given floor number and nurses name.  Family updated re: pt status after security code verified.

## 2025-04-04 ENCOUNTER — HOSPITAL ENCOUNTER (OUTPATIENT)
Dept: CARDIAC CATH/INVASIVE PROCEDURES | Age: 70
Discharge: HOME | End: 2025-04-04
Attending: INTERNAL MEDICINE
Payer: MEDICARE

## 2025-04-04 PROCEDURE — 93325 DOPPLER ECHO COLOR FLOW MAPG: CPT | Performed by: INTERNAL MEDICINE

## 2025-04-04 PROCEDURE — 93312 ECHO TRANSESOPHAGEAL: CPT | Performed by: INTERNAL MEDICINE

## 2025-04-04 PROCEDURE — 93312 ECHO TRANSESOPHAGEAL: CPT

## 2025-04-04 PROCEDURE — 93325 DOPPLER ECHO COLOR FLOW MAPG: CPT

## 2025-04-04 NOTE — PROGRESS NOTES
FOLLOW UP CALL:    POST  AFIB ABLATION W/ Vital    Pt feels great. Had minimal bleeding from one groin site. He just put bandaid over site and will try other side tomorrow. No other issues.

## 2025-04-07 ENCOUNTER — HOSPITAL ENCOUNTER (OUTPATIENT)
Dept: LAB | Age: 70
Discharge: HOME | End: 2025-04-07
Payer: MEDICARE

## 2025-04-07 ENCOUNTER — OFFICE VISIT (OUTPATIENT)
Dept: ONCOLOGY | Age: 70
End: 2025-04-07
Payer: MEDICARE

## 2025-04-07 VITALS
TEMPERATURE: 97.8 F | BODY MASS INDEX: 34.42 KG/M2 | SYSTOLIC BLOOD PRESSURE: 153 MMHG | HEART RATE: 63 BPM | HEIGHT: 72 IN | OXYGEN SATURATION: 97 % | DIASTOLIC BLOOD PRESSURE: 87 MMHG | RESPIRATION RATE: 18 BRPM | WEIGHT: 254.1 LBS

## 2025-04-07 DIAGNOSIS — C43.9 METASTATIC MELANOMA (HCC): Primary | ICD-10-CM

## 2025-04-07 DIAGNOSIS — C43.62 MELANOMA OF LEFT UPPER ARM (HCC): ICD-10-CM

## 2025-04-07 LAB
ALBUMIN SERPL-MCNC: 4 G/DL (ref 3.2–4.6)
ALBUMIN/GLOB SERPL: 1.1 (ref 1–1.9)
ALP SERPL-CCNC: 63 U/L (ref 40–129)
ALT SERPL-CCNC: 25 U/L (ref 8–55)
ANION GAP SERPL CALC-SCNC: 11 MMOL/L (ref 7–16)
AST SERPL-CCNC: 30 U/L (ref 15–37)
BASOPHILS # BLD: 0.04 K/UL (ref 0–0.2)
BASOPHILS NFR BLD: 0.6 % (ref 0–2)
BILIRUB SERPL-MCNC: 0.5 MG/DL (ref 0–1.2)
BUN SERPL-MCNC: 23 MG/DL (ref 8–23)
CALCIUM SERPL-MCNC: 10.2 MG/DL (ref 8.8–10.2)
CHLORIDE SERPL-SCNC: 99 MMOL/L (ref 98–107)
CO2 SERPL-SCNC: 26 MMOL/L (ref 20–29)
CREAT SERPL-MCNC: 1.01 MG/DL (ref 0.8–1.3)
DIFFERENTIAL METHOD BLD: ABNORMAL
EOSINOPHIL # BLD: 0.35 K/UL (ref 0–0.8)
EOSINOPHIL NFR BLD: 4.9 % (ref 0.5–7.8)
ERYTHROCYTE [DISTWIDTH] IN BLOOD BY AUTOMATED COUNT: 14 % (ref 11.9–14.6)
GLOBULIN SER CALC-MCNC: 3.5 G/DL (ref 2.3–3.5)
GLUCOSE SERPL-MCNC: 141 MG/DL (ref 70–99)
HCT VFR BLD AUTO: 40.2 % (ref 41.1–50.3)
HGB BLD-MCNC: 13.8 G/DL (ref 13.6–17.2)
IMM GRANULOCYTES # BLD AUTO: 0.02 K/UL (ref 0–0.5)
IMM GRANULOCYTES NFR BLD AUTO: 0.3 % (ref 0–5)
LYMPHOCYTES # BLD: 1.31 K/UL (ref 0.5–4.6)
LYMPHOCYTES NFR BLD: 18.2 % (ref 13–44)
MCH RBC QN AUTO: 33.3 PG (ref 26.1–32.9)
MCHC RBC AUTO-ENTMCNC: 34.3 G/DL (ref 31.4–35)
MCV RBC AUTO: 96.9 FL (ref 82–102)
MONOCYTES # BLD: 0.95 K/UL (ref 0.1–1.3)
MONOCYTES NFR BLD: 13.2 % (ref 4–12)
NEUTS SEG # BLD: 4.51 K/UL (ref 1.7–8.2)
NEUTS SEG NFR BLD: 62.8 % (ref 43–78)
NRBC # BLD: 0 K/UL (ref 0–0.2)
PLATELET # BLD AUTO: 257 K/UL (ref 150–450)
PMV BLD AUTO: 9.9 FL (ref 9.4–12.3)
POTASSIUM SERPL-SCNC: 4.1 MMOL/L (ref 3.5–5.1)
PROT SERPL-MCNC: 7.6 G/DL (ref 6.3–8.2)
RBC # BLD AUTO: 4.15 M/UL (ref 4.23–5.6)
SODIUM SERPL-SCNC: 136 MMOL/L (ref 136–145)
WBC # BLD AUTO: 7.2 K/UL (ref 4.3–11.1)

## 2025-04-07 PROCEDURE — 3079F DIAST BP 80-89 MM HG: CPT | Performed by: INTERNAL MEDICINE

## 2025-04-07 PROCEDURE — 3077F SYST BP >= 140 MM HG: CPT | Performed by: INTERNAL MEDICINE

## 2025-04-07 PROCEDURE — 36415 COLL VENOUS BLD VENIPUNCTURE: CPT

## 2025-04-07 PROCEDURE — 99214 OFFICE O/P EST MOD 30 MIN: CPT | Performed by: INTERNAL MEDICINE

## 2025-04-07 PROCEDURE — 1036F TOBACCO NON-USER: CPT | Performed by: INTERNAL MEDICINE

## 2025-04-07 PROCEDURE — 1159F MED LIST DOCD IN RCRD: CPT | Performed by: INTERNAL MEDICINE

## 2025-04-07 PROCEDURE — 80053 COMPREHEN METABOLIC PANEL: CPT

## 2025-04-07 PROCEDURE — 1126F AMNT PAIN NOTED NONE PRSNT: CPT | Performed by: INTERNAL MEDICINE

## 2025-04-07 PROCEDURE — 85025 COMPLETE CBC W/AUTO DIFF WBC: CPT

## 2025-04-07 PROCEDURE — G8417 CALC BMI ABV UP PARAM F/U: HCPCS | Performed by: INTERNAL MEDICINE

## 2025-04-07 PROCEDURE — 3017F COLORECTAL CA SCREEN DOC REV: CPT | Performed by: INTERNAL MEDICINE

## 2025-04-07 PROCEDURE — 1160F RVW MEDS BY RX/DR IN RCRD: CPT | Performed by: INTERNAL MEDICINE

## 2025-04-07 PROCEDURE — G8427 DOCREV CUR MEDS BY ELIG CLIN: HCPCS | Performed by: INTERNAL MEDICINE

## 2025-04-07 PROCEDURE — 1123F ACP DISCUSS/DSCN MKR DOCD: CPT | Performed by: INTERNAL MEDICINE

## 2025-04-07 ASSESSMENT — PATIENT HEALTH QUESTIONNAIRE - PHQ9
SUM OF ALL RESPONSES TO PHQ QUESTIONS 1-9: 0
SUM OF ALL RESPONSES TO PHQ QUESTIONS 1-9: 0
1. LITTLE INTEREST OR PLEASURE IN DOING THINGS: NOT AT ALL
2. FEELING DOWN, DEPRESSED OR HOPELESS: NOT AT ALL
SUM OF ALL RESPONSES TO PHQ QUESTIONS 1-9: 0
SUM OF ALL RESPONSES TO PHQ QUESTIONS 1-9: 0

## 2025-04-07 NOTE — PROGRESS NOTES
adding further systemic therapy in this situation may change outcome, return in 2 to 3 weeks to follow PET result and radiation oncology decision and further plan, call as needed.  He received additional radiation in 12/2024, repeat CT chest abdomen pelvis 4/4/25 showed AGNES/CR, feels well clinically and had A-fib ablation with good result, labs reviewed stable and need to monitor elevated blood pressure, discussed surveillance and arrange repeat PET scan in 6 months and then return to discuss result but call as needed.    All questions are answered to his satisfaction.  He will call for further questions and concerns.        ECOG PERFORMANCE STATUS - 0-Fully active, able to carry on all pre-disease performance without restriction.    Pain - 0 - No pain/10. None/Minimal pain - not affecting QOL     Fatigue -   Distress -        No data to display                    Total time independently spent on today's visit was 30min. This time included: face-to-face time evaluating the patient as well as additional non-face-to-face time spent on: Preparing to see the patient by obtaining and reviewing previous test results, records and medical history, Performing a medically appropriate history and exam and documenting relevant clinical information for this visit, Counseling and educating patient and family, Ordering tests, Communicating with other health care professionals and Referring patient to another health care provider.    Elements of this note have been dictated via voice recognition software.  Text and phrases may be limited by the accuracy and autoconversion of the software.  The chart has been reviewed, but errors may still be present.          [place] [address]  Office : [phone]  Fax : [phone]

## 2025-04-07 NOTE — PATIENT INSTRUCTIONS
rescheduling needs at least 24 hours before your scheduled appointment time.If you have any questions regarding your upcoming schedule please reach out to your care team through [employer] or call [phone].     Please notify your assigned Nurse Navigator of any unplanned hospital admissions or Emergency Room visits within 24 hours of discharge.    -------------------------------------------------------------------------------------------------------------------  Please call our office at [phone] if you have any  of the following symptoms:   Fever of 100.5 or greater  Chills  Shortness of breath  Swelling or pain in one leg    After office hours an answering service is available and will contact a provider for emergencies or if you are experiencing any of the above symptoms.  JONES DINH RN

## 2025-04-28 RX ORDER — PANTOPRAZOLE SODIUM 40 MG/1
80 TABLET, DELAYED RELEASE ORAL
Qty: 180 TABLET | Refills: 1 | OUTPATIENT
Start: 2025-04-28

## 2025-04-28 RX ORDER — COLCHICINE 0.6 MG/1
0.6 TABLET ORAL DAILY
Qty: 90 TABLET | Refills: 1 | OUTPATIENT
Start: 2025-04-28

## 2025-05-02 ENCOUNTER — OFFICE VISIT (OUTPATIENT)
Age: 70
End: 2025-05-02
Payer: MEDICARE

## 2025-05-02 VITALS
BODY MASS INDEX: 34.46 KG/M2 | HEIGHT: 72 IN | DIASTOLIC BLOOD PRESSURE: 98 MMHG | WEIGHT: 254.4 LBS | SYSTOLIC BLOOD PRESSURE: 140 MMHG | HEART RATE: 58 BPM

## 2025-05-02 DIAGNOSIS — I48.19 PERSISTENT ATRIAL FIBRILLATION (HCC): Primary | ICD-10-CM

## 2025-05-02 PROCEDURE — G8417 CALC BMI ABV UP PARAM F/U: HCPCS | Performed by: PHYSICIAN ASSISTANT

## 2025-05-02 PROCEDURE — 1036F TOBACCO NON-USER: CPT | Performed by: PHYSICIAN ASSISTANT

## 2025-05-02 PROCEDURE — 1125F AMNT PAIN NOTED PAIN PRSNT: CPT | Performed by: PHYSICIAN ASSISTANT

## 2025-05-02 PROCEDURE — 99214 OFFICE O/P EST MOD 30 MIN: CPT | Performed by: PHYSICIAN ASSISTANT

## 2025-05-02 PROCEDURE — 93000 ELECTROCARDIOGRAM COMPLETE: CPT | Performed by: PHYSICIAN ASSISTANT

## 2025-05-02 PROCEDURE — 1159F MED LIST DOCD IN RCRD: CPT | Performed by: PHYSICIAN ASSISTANT

## 2025-05-02 PROCEDURE — 3077F SYST BP >= 140 MM HG: CPT | Performed by: PHYSICIAN ASSISTANT

## 2025-05-02 PROCEDURE — 1160F RVW MEDS BY RX/DR IN RCRD: CPT | Performed by: PHYSICIAN ASSISTANT

## 2025-05-02 PROCEDURE — 1123F ACP DISCUSS/DSCN MKR DOCD: CPT | Performed by: PHYSICIAN ASSISTANT

## 2025-05-02 PROCEDURE — 3017F COLORECTAL CA SCREEN DOC REV: CPT | Performed by: PHYSICIAN ASSISTANT

## 2025-05-02 PROCEDURE — 3080F DIAST BP >= 90 MM HG: CPT | Performed by: PHYSICIAN ASSISTANT

## 2025-05-02 PROCEDURE — G8427 DOCREV CUR MEDS BY ELIG CLIN: HCPCS | Performed by: PHYSICIAN ASSISTANT

## 2025-05-02 NOTE — PROGRESS NOTES
extended release tablet TAKE 1 TABLET BY MOUTH EVERY DAY WITH EVENING MEAL 90 tablet 1    hydroCHLOROthiazide (HYDRODIURIL) 25 MG tablet Take 1 tablet by mouth every morning 90 tablet 1    silver sulfADIAZINE (SILVADENE) 1 % cream Apply topically 3-5 times daily to areas of peeling 50 g 0    Multiple Vitamins-Minerals (THERAPEUTIC MULTIVITAMIN-MINERALS) tablet Take 1 tablet by mouth daily       No current facility-administered medications for this visit.     Allergies   Allergen Reactions    Penicillins Rash    Shellfish Allergy Nausea And Vomiting     Pt states allergic to oysters     Past Medical History:   Diagnosis Date    Hypertension     managed with med    Melanoma of left upper arm (HCC)     Type 2 diabetes mellitus without complication (HCC)     oral reliant; does not check glucose; denies hypo sx; Last A1C 7.2 on 07/09/2024     Past Surgical History:   Procedure Laterality Date    AXILLARY SURGERY Left 10/10/2024    AXILLARY LESION BIOPSY EXCISION LEFT performed by Karishma Tate MD at [facility] MAIN OR    COLONOSCOPY  2022    EP DEVICE PROCEDURE N/A 4/3/2025    Ablation A-fib w complete ep study performed by Braulio Vital MD at [facility] CARDIAC CATH LAB    PANCREAS BIOPSY      ROTATOR CUFF REPAIR Left     SKIN CANCER EXCISION Left     melanoma removed from axillary    UMBILICAL HERNIA REPAIR       History reviewed. No pertinent family history.  Social History     Tobacco Use    Smoking status: Never    Smokeless tobacco: Never   Substance Use Topics    Alcohol use: Yes     Comment: social     PHYSICAL EXAM:   BP (!) 140/98   Pulse 58   Ht 1.816 m (5' 11.5\")   Wt 115.4 kg (254 lb 6.4 oz)   BMI 34.99 kg/m²      Wt Readings from Last 3 Encounters:   05/02/25 115.4 kg (254 lb 6.4 oz)   04/07/25 115.3 kg (254 lb 1.6 oz)   04/03/25 115.7 kg (255 lb)     BP Readings from Last 3 Encounters:   05/02/25 (!) 140/98   04/07/25 (!) 153/87   04/03/25 (!) 150/80     Gen: well appearing, well developed, NAD  Eyes: Pupils

## 2025-05-13 ENCOUNTER — OFFICE VISIT (OUTPATIENT)
Dept: INTERNAL MEDICINE CLINIC | Facility: CLINIC | Age: 70
End: 2025-05-13
Payer: MEDICARE

## 2025-05-13 VITALS
TEMPERATURE: 97.2 F | HEART RATE: 61 BPM | DIASTOLIC BLOOD PRESSURE: 86 MMHG | HEIGHT: 71 IN | OXYGEN SATURATION: 98 % | BODY MASS INDEX: 34.97 KG/M2 | SYSTOLIC BLOOD PRESSURE: 138 MMHG | WEIGHT: 249.8 LBS

## 2025-05-13 DIAGNOSIS — I10 PRIMARY HYPERTENSION: ICD-10-CM

## 2025-05-13 DIAGNOSIS — E11.9 TYPE 2 DIABETES MELLITUS WITHOUT COMPLICATION, WITHOUT LONG-TERM CURRENT USE OF INSULIN (HCC): Primary | ICD-10-CM

## 2025-05-13 DIAGNOSIS — C43.62 MELANOMA OF LEFT UPPER ARM (HCC): ICD-10-CM

## 2025-05-13 PROCEDURE — 1036F TOBACCO NON-USER: CPT | Performed by: INTERNAL MEDICINE

## 2025-05-13 PROCEDURE — G2211 COMPLEX E/M VISIT ADD ON: HCPCS | Performed by: INTERNAL MEDICINE

## 2025-05-13 PROCEDURE — G8417 CALC BMI ABV UP PARAM F/U: HCPCS | Performed by: INTERNAL MEDICINE

## 2025-05-13 PROCEDURE — 1160F RVW MEDS BY RX/DR IN RCRD: CPT | Performed by: INTERNAL MEDICINE

## 2025-05-13 PROCEDURE — 3051F HG A1C>EQUAL 7.0%<8.0%: CPT | Performed by: INTERNAL MEDICINE

## 2025-05-13 PROCEDURE — 3017F COLORECTAL CA SCREEN DOC REV: CPT | Performed by: INTERNAL MEDICINE

## 2025-05-13 PROCEDURE — G8427 DOCREV CUR MEDS BY ELIG CLIN: HCPCS | Performed by: INTERNAL MEDICINE

## 2025-05-13 PROCEDURE — 2022F DILAT RTA XM EVC RTNOPTHY: CPT | Performed by: INTERNAL MEDICINE

## 2025-05-13 PROCEDURE — 3079F DIAST BP 80-89 MM HG: CPT | Performed by: INTERNAL MEDICINE

## 2025-05-13 PROCEDURE — 1123F ACP DISCUSS/DSCN MKR DOCD: CPT | Performed by: INTERNAL MEDICINE

## 2025-05-13 PROCEDURE — 99214 OFFICE O/P EST MOD 30 MIN: CPT | Performed by: INTERNAL MEDICINE

## 2025-05-13 PROCEDURE — 1159F MED LIST DOCD IN RCRD: CPT | Performed by: INTERNAL MEDICINE

## 2025-05-13 PROCEDURE — 3075F SYST BP GE 130 - 139MM HG: CPT | Performed by: INTERNAL MEDICINE

## 2025-05-13 RX ORDER — HYDRALAZINE HYDROCHLORIDE 50 MG/1
50 TABLET, FILM COATED ORAL 3 TIMES DAILY
Qty: 270 TABLET | Refills: 1 | Status: SHIPPED | OUTPATIENT
Start: 2025-05-13

## 2025-05-13 RX ORDER — LISINOPRIL 40 MG/1
40 TABLET ORAL DAILY
Qty: 90 TABLET | Refills: 1 | Status: SHIPPED | OUTPATIENT
Start: 2025-05-13

## 2025-05-13 RX ORDER — CARVEDILOL 6.25 MG/1
6.25 TABLET ORAL 2 TIMES DAILY WITH MEALS
Qty: 180 TABLET | Refills: 1 | Status: SHIPPED | OUTPATIENT
Start: 2025-05-13

## 2025-05-13 RX ORDER — HYDROCHLOROTHIAZIDE 25 MG/1
25 TABLET ORAL EVERY MORNING
Qty: 90 TABLET | Refills: 1 | Status: SHIPPED | OUTPATIENT
Start: 2025-05-13

## 2025-05-13 RX ORDER — METFORMIN HYDROCHLORIDE 750 MG/1
TABLET, EXTENDED RELEASE ORAL
Qty: 90 TABLET | Refills: 1 | Status: SHIPPED | OUTPATIENT
Start: 2025-05-13

## 2025-05-13 ASSESSMENT — ENCOUNTER SYMPTOMS: SHORTNESS OF BREATH: 0

## 2025-05-13 NOTE — PROGRESS NOTES
[facility]  Santosh Rosales M.D.  Internal Medicine  [address] [place] [id number]  Office : [phone]  Fax : [phone]    Chief Complaint   Patient presents with    Diabetes     3 month follow up       History of Present Illness:  Yair Gee is a 69 y.o. male.  HPI    Diabetes Mellitus  Patient presents  for follow up on diabetes.  Onset of symptoms was several years ago. Patient describes symptoms as none. Course to date has been well controlled.Diet and Lifestyle: follows a diabetic diet regularly  exercises sporadically  nonsmoker  Home glucose monitoring:  Results average n/a. Patient denies polyuria and polydipsia. No wounds in lower limbs.  Most recent HbA1c was   Hemoglobin A1C   Date Value Ref Range Status   01/28/2025 7.5 (H) 0 - 5.6 % Final     Comment:     Reference Range  Normal       <5.7%  Prediabetes  5.7-6.4%  Diabetes     >6.4%         Hypertension  Patient is in for Hypertension which is stable.    Diet and Lifestyle: generally follows a low sodium diet  Home BP Monitoring: is not measured at home.  Patient's recent blood pressures were   BP Readings from Last 3 Encounters:   05/13/25 138/86   05/02/25 (!) 140/98   04/07/25 (!) 153/87   .   taking medications as instructed, no medication side effects noted, no TIA's, no chest pain on exertion, no dyspnea on exertion, no swelling of ankles. Cardiac risk factors consist of diabetes mellitus, dyslipidemia, hypertension, and male gender.  Lab Results   Component Value Date/Time     04/07/2025 12:45 PM    K 4.1 04/07/2025 12:45 PM    CL 99 04/07/2025 12:45 PM    CO2 26 04/07/2025 12:45 PM    BUN 23 04/07/2025 12:45 PM    CREATININE 1.01 04/07/2025 12:45 PM    GLUCOSE 141 04/07/2025 12:45 PM    CALCIUM 10.2 04/07/2025 12:45 PM    LABGLOM 80 04/07/2025 12:45 PM    LABGLOM 59 02/06/2024 01:08 PM        Melanoma skin cancer  Stage 3 in left arm that was present in a lymph node in

## 2025-07-02 ENCOUNTER — OFFICE VISIT (OUTPATIENT)
Age: 70
End: 2025-07-02
Payer: MEDICARE

## 2025-07-02 VITALS
DIASTOLIC BLOOD PRESSURE: 90 MMHG | HEART RATE: 58 BPM | WEIGHT: 250.5 LBS | HEIGHT: 71 IN | SYSTOLIC BLOOD PRESSURE: 138 MMHG | BODY MASS INDEX: 35.07 KG/M2

## 2025-07-02 DIAGNOSIS — I48.19 PERSISTENT ATRIAL FIBRILLATION (HCC): Primary | ICD-10-CM

## 2025-07-02 DIAGNOSIS — I10 PRIMARY HYPERTENSION: ICD-10-CM

## 2025-07-02 PROCEDURE — 3080F DIAST BP >= 90 MM HG: CPT | Performed by: INTERNAL MEDICINE

## 2025-07-02 PROCEDURE — G8428 CUR MEDS NOT DOCUMENT: HCPCS | Performed by: INTERNAL MEDICINE

## 2025-07-02 PROCEDURE — 93000 ELECTROCARDIOGRAM COMPLETE: CPT | Performed by: INTERNAL MEDICINE

## 2025-07-02 PROCEDURE — 1126F AMNT PAIN NOTED NONE PRSNT: CPT | Performed by: INTERNAL MEDICINE

## 2025-07-02 PROCEDURE — 1123F ACP DISCUSS/DSCN MKR DOCD: CPT | Performed by: INTERNAL MEDICINE

## 2025-07-02 PROCEDURE — 99213 OFFICE O/P EST LOW 20 MIN: CPT | Performed by: INTERNAL MEDICINE

## 2025-07-02 PROCEDURE — 1036F TOBACCO NON-USER: CPT | Performed by: INTERNAL MEDICINE

## 2025-07-02 PROCEDURE — G8417 CALC BMI ABV UP PARAM F/U: HCPCS | Performed by: INTERNAL MEDICINE

## 2025-07-02 PROCEDURE — 3017F COLORECTAL CA SCREEN DOC REV: CPT | Performed by: INTERNAL MEDICINE

## 2025-07-02 PROCEDURE — 3075F SYST BP GE 130 - 139MM HG: CPT | Performed by: INTERNAL MEDICINE

## 2025-07-02 NOTE — PROGRESS NOTES
[facility] CARDIOLOGY, [address], [address]  [place] [address]  PHONE: [phone]  Yair Gee  1955    Chief Complant:    Chief Complaint   Patient presents with    Atrial Fibrillation    Follow-up    Results     echo      Consultation is requested by [unfilled] for evaluation of Atrial Fibrillation, Follow-up, and Results (echo)    Reason for Consultation: afib    History:  Yair Gee is a very pleasant 69 y.o. male with a past medical and cardiac history significant for HTN, DM, persistent atrial fibrillation and presents s/p afib ablation for follow up. He is doing well, no complaints.     Cardiac PMH: (Old records have been reviewed and summarized below)  TTE (2/10/25): EF 45-50%  EKG (1/28/25) personally viewed and interpreted: afib, NSST  EKG (1/28/25) personally viewed and interpreted: NSR    Reviewed office note Dr. Rosales 1/28/25    Past Medical History, Past Surgical History, Family history, Social History, and Medications were all reviewed with the patient today and updated as necessary.     Current Outpatient Medications   Medication Sig Dispense Refill    carvedilol (COREG) 6.25 MG tablet Take 1 tablet by mouth 2 times daily (with meals) 180 tablet 1    hydrALAZINE (APRESOLINE) 50 MG tablet Take 1 tablet by mouth 3 times daily 270 tablet 1    hydroCHLOROthiazide (HYDRODIURIL) 25 MG tablet Take 1 tablet by mouth every morning 90 tablet 1    lisinopril (PRINIVIL;ZESTRIL) 40 MG tablet Take 1 tablet by mouth daily 90 tablet 1    metFORMIN (GLUCOPHAGE-XR) 750 MG extended release tablet TAKE 1 TABLET BY MOUTH EVERY DAY WITH EVENING MEAL 90 tablet 1    apixaban (ELIQUIS) 5 MG TABS tablet Take 1 tablet by mouth 2 times daily 60 tablet 11    silver sulfADIAZINE (SILVADENE) 1 % cream Apply topically 3-5 times daily to areas of peeling 50 g 0    Multiple Vitamins-Minerals (THERAPEUTIC MULTIVITAMIN-MINERALS) tablet Take 1 tablet by mouth daily       No current

## 2025-07-03 ENCOUNTER — OFFICE VISIT (OUTPATIENT)
Dept: INTERNAL MEDICINE CLINIC | Facility: CLINIC | Age: 70
End: 2025-07-03

## 2025-07-03 VITALS
WEIGHT: 250 LBS | OXYGEN SATURATION: 95 % | TEMPERATURE: 98.1 F | HEIGHT: 72 IN | SYSTOLIC BLOOD PRESSURE: 126 MMHG | DIASTOLIC BLOOD PRESSURE: 81 MMHG | BODY MASS INDEX: 33.86 KG/M2 | HEART RATE: 69 BPM

## 2025-07-03 DIAGNOSIS — R23.8: Primary | ICD-10-CM

## 2025-07-03 ASSESSMENT — ENCOUNTER SYMPTOMS: COLOR CHANGE: 1

## 2025-07-03 NOTE — PROGRESS NOTES
normal.           Assessment/Plan:  Yair was seen today for toe pain.    Diagnoses and all orders for this visit:    Skin color different on contralateral extremity    Patient reassured.  No treatment necessary    Return if symptoms worsen or fail to improve.  __  Santosh Rosales M.D.

## (undated) DEVICE — CATHETER EP 7FR L115CM 2-8-2MM SPC TIP 2MM 10 ELECTRD F L

## (undated) DEVICE — STRIP,CLOSURE,WOUND,MEDI-STRIP,1/2X4: Brand: MEDLINE

## (undated) DEVICE — CATHETER ABLATION QDOT MICRO FJ CURVE BIDIRECTIONAL

## (undated) DEVICE — GAUZE,SPONGE,4"X4",16PLY,STRL,LF,10/TRAY: Brand: MEDLINE

## (undated) DEVICE — TUBING PMP FOR CARTO SYS SMARTABLATE

## (undated) DEVICE — 18G NG KIT WITH 96IN PROBE COVER (10 PK): Brand: SITE-RITE

## (undated) DEVICE — GLOVE SURG SZ 6.5 L11.2IN THK8.6MIL LT BRN LTX FREE

## (undated) DEVICE — PRESSURE MONITORING SET: Brand: TRUWAVE

## (undated) DEVICE — CATHETER REPROC EP F-J BIOBD710FJ282CTR

## (undated) DEVICE — GLOVE ORANGE PI 7 1/2   MSG9075

## (undated) DEVICE — CATHETER MAP F CRV 2-2-2-2-2 MM SPC PERSEID OCTARAY

## (undated) DEVICE — PINNACLE INTRODUCER SHEATH: Brand: PINNACLE

## (undated) DEVICE — CATHETER REPROC ELCTRPHSLGY 10FR DIA 90CML DGNSTC ULTRSND F

## (undated) DEVICE — MASTISOL ADHESIVE LIQ 2/3ML

## (undated) DEVICE — PINNACLE TIF INTRODUCER SHEATH: Brand: PINNACLE

## (undated) DEVICE — NEEDLE HYPO 25GA L1.5IN BLU POLYPR HUB S STL REG BVL STR

## (undated) DEVICE — SYSTEM CLOSURE 6-12 FR VEN VASC VASCADE MVP

## (undated) DEVICE — GLOVE SURG SZ 65 THK91MIL LTX FREE SYN POLYISOPRENE

## (undated) DEVICE — MINOR SPLIT GENERAL: Brand: MEDLINE INDUSTRIES, INC.

## (undated) DEVICE — SHEATH INTRO 50 DEG 0.038 INX180 CM 8.5 FRX63 CM HEARTSPAN

## (undated) DEVICE — PATCH REF EXT FOR CARTO 3 SYS (EA = 6 PACKS)

## (undated) DEVICE — Device: Brand: NRG TRANSSEPTAL NEEDLE

## (undated) DEVICE — GOWN SURG 2XL 49 IN AAMI LEVEL 3 ORBIS

## (undated) DEVICE — STERILE (15.2 TAPERED TO 7.6 X 183CM) POLYETHYLENE ACCORDION-FOLDED COVER FOR USE WITH SIEMENS ACUNAV ULTRASOUND CATHETER FAMILY CONNECTOR: Brand: SWIFTLINK TRANSDUCER COVER